# Patient Record
Sex: MALE | Race: NATIVE HAWAIIAN OR OTHER PACIFIC ISLANDER | HISPANIC OR LATINO | Employment: UNEMPLOYED | URBAN - METROPOLITAN AREA
[De-identification: names, ages, dates, MRNs, and addresses within clinical notes are randomized per-mention and may not be internally consistent; named-entity substitution may affect disease eponyms.]

---

## 2024-05-31 DIAGNOSIS — Z02.89 REFUGEE HEALTH EXAMINATION: Primary | ICD-10-CM

## 2024-06-04 ENCOUNTER — APPOINTMENT (OUTPATIENT)
Dept: INTERPRETER SERVICES | Facility: CLINIC | Age: 34
End: 2024-06-04
Payer: MEDICAID

## 2024-06-06 ENCOUNTER — LAB (OUTPATIENT)
Dept: LAB | Facility: CLINIC | Age: 34
End: 2024-06-06
Payer: MEDICAID

## 2024-06-06 DIAGNOSIS — Z02.89 REFUGEE HEALTH EXAMINATION: ICD-10-CM

## 2024-06-06 DIAGNOSIS — R73.09 ELEVATED GLUCOSE: ICD-10-CM

## 2024-06-06 LAB
BASOPHILS # BLD AUTO: 0.1 10E3/UL (ref 0–0.2)
BASOPHILS NFR BLD AUTO: 1 %
EOSINOPHIL # BLD AUTO: 0.1 10E3/UL (ref 0–0.7)
EOSINOPHIL NFR BLD AUTO: 1 %
ERYTHROCYTE [DISTWIDTH] IN BLOOD BY AUTOMATED COUNT: 12.6 % (ref 10–15)
HCT VFR BLD AUTO: 44.7 % (ref 40–53)
HGB BLD-MCNC: 15.2 G/DL (ref 13.3–17.7)
IMM GRANULOCYTES # BLD: 0 10E3/UL
IMM GRANULOCYTES NFR BLD: 0 %
LYMPHOCYTES # BLD AUTO: 2.7 10E3/UL (ref 0.8–5.3)
LYMPHOCYTES NFR BLD AUTO: 33 %
MCH RBC QN AUTO: 27.1 PG (ref 26.5–33)
MCHC RBC AUTO-ENTMCNC: 34 G/DL (ref 31.5–36.5)
MCV RBC AUTO: 80 FL (ref 78–100)
MONOCYTES # BLD AUTO: 0.5 10E3/UL (ref 0–1.3)
MONOCYTES NFR BLD AUTO: 7 %
NEUTROPHILS # BLD AUTO: 4.7 10E3/UL (ref 1.6–8.3)
NEUTROPHILS NFR BLD AUTO: 58 %
NRBC # BLD AUTO: 0 10E3/UL
NRBC BLD AUTO-RTO: 0 /100
PLATELET # BLD AUTO: 321 10E3/UL (ref 150–450)
RBC # BLD AUTO: 5.6 10E6/UL (ref 4.4–5.9)
WBC # BLD AUTO: 8.1 10E3/UL (ref 4–11)

## 2024-06-06 PROCEDURE — 86706 HEP B SURFACE ANTIBODY: CPT

## 2024-06-06 PROCEDURE — 83721 ASSAY OF BLOOD LIPOPROTEIN: CPT | Mod: 59

## 2024-06-06 PROCEDURE — 86787 VARICELLA-ZOSTER ANTIBODY: CPT

## 2024-06-06 PROCEDURE — 86780 TREPONEMA PALLIDUM: CPT

## 2024-06-06 PROCEDURE — 86481 TB AG RESPONSE T-CELL SUSP: CPT

## 2024-06-06 PROCEDURE — 87389 HIV-1 AG W/HIV-1&-2 AB AG IA: CPT

## 2024-06-06 PROCEDURE — 36415 COLL VENOUS BLD VENIPUNCTURE: CPT

## 2024-06-06 PROCEDURE — 87340 HEPATITIS B SURFACE AG IA: CPT

## 2024-06-06 PROCEDURE — 86803 HEPATITIS C AB TEST: CPT

## 2024-06-06 PROCEDURE — 85025 COMPLETE CBC W/AUTO DIFF WBC: CPT

## 2024-06-06 PROCEDURE — 80061 LIPID PANEL: CPT

## 2024-06-06 PROCEDURE — 86708 HEPATITIS A ANTIBODY: CPT

## 2024-06-06 PROCEDURE — 87491 CHLMYD TRACH DNA AMP PROBE: CPT

## 2024-06-06 PROCEDURE — 99000 SPECIMEN HANDLING OFFICE-LAB: CPT

## 2024-06-06 PROCEDURE — 86704 HEP B CORE ANTIBODY TOTAL: CPT

## 2024-06-06 PROCEDURE — 80053 COMPREHEN METABOLIC PANEL: CPT

## 2024-06-07 LAB
ALBUMIN SERPL BCG-MCNC: 4.6 G/DL (ref 3.5–5.2)
ALP SERPL-CCNC: 136 U/L (ref 40–150)
ALT SERPL W P-5'-P-CCNC: 60 U/L (ref 0–70)
ANION GAP SERPL CALCULATED.3IONS-SCNC: 14 MMOL/L (ref 7–15)
AST SERPL W P-5'-P-CCNC: 34 U/L (ref 0–45)
BILIRUB SERPL-MCNC: 0.5 MG/DL
BUN SERPL-MCNC: 11.7 MG/DL (ref 6–20)
C TRACH DNA SPEC QL NAA+PROBE: NEGATIVE
CALCIUM SERPL-MCNC: 9.8 MG/DL (ref 8.6–10)
CHLORIDE SERPL-SCNC: 96 MMOL/L (ref 98–107)
CHOLEST SERPL-MCNC: 232 MG/DL
CREAT SERPL-MCNC: 0.66 MG/DL (ref 0.67–1.17)
DEPRECATED HCO3 PLAS-SCNC: 21 MMOL/L (ref 22–29)
EGFRCR SERPLBLD CKD-EPI 2021: >90 ML/MIN/1.73M2
FASTING STATUS PATIENT QL REPORTED: NO
FASTING STATUS PATIENT QL REPORTED: NO
GLUCOSE SERPL-MCNC: 482 MG/DL (ref 70–99)
HAV AB SER QL IA: REACTIVE
HBV CORE AB SERPL QL IA: NONREACTIVE
HBV SURFACE AB SERPL IA-ACNC: >1000 M[IU]/ML
HBV SURFACE AB SERPL IA-ACNC: REACTIVE M[IU]/ML
HBV SURFACE AG SERPL QL IA: NONREACTIVE
HCV AB SERPL QL IA: NONREACTIVE
HDLC SERPL-MCNC: 34 MG/DL
HIV 1+2 AB+HIV1 P24 AG SERPL QL IA: NONREACTIVE
LDLC SERPL CALC-MCNC: ABNORMAL MG/DL
LDLC SERPL DIRECT ASSAY-MCNC: 138 MG/DL
NONHDLC SERPL-MCNC: 198 MG/DL
POTASSIUM SERPL-SCNC: 4.4 MMOL/L (ref 3.4–5.3)
PROT SERPL-MCNC: 7.9 G/DL (ref 6.4–8.3)
QUANTIFERON MITOGEN: 10 IU/ML
QUANTIFERON NIL TUBE: 0.15 IU/ML
QUANTIFERON TB1 TUBE: 0.1 IU/ML
QUANTIFERON TB2 TUBE: 0.09
SODIUM SERPL-SCNC: 131 MMOL/L (ref 135–145)
T PALLIDUM AB SER QL: NONREACTIVE
TRIGL SERPL-MCNC: 710 MG/DL
VZV IGG SER QL IA: 1258 INDEX
VZV IGG SER QL IA: POSITIVE

## 2024-06-08 LAB
SCHISTOSOMA IGG SER IA-ACNC: 5 U
STRONGYLOIDES IGG SER IA-ACNC: 0.1 IV

## 2024-06-10 DIAGNOSIS — R73.09 ELEVATED GLUCOSE: Primary | ICD-10-CM

## 2024-06-10 LAB
GAMMA INTERFERON BACKGROUND BLD IA-ACNC: 0.15 IU/ML
M TB IFN-G BLD-IMP: NEGATIVE
M TB IFN-G CD4+ BCKGRND COR BLD-ACNC: 9.85 IU/ML
MITOGEN IGNF BCKGRD COR BLD-ACNC: -0.05 IU/ML
MITOGEN IGNF BCKGRD COR BLD-ACNC: -0.06 IU/ML

## 2024-06-12 ENCOUNTER — OFFICE VISIT (OUTPATIENT)
Dept: FAMILY MEDICINE | Facility: CLINIC | Age: 34
End: 2024-06-12
Payer: MEDICAID

## 2024-06-12 VITALS
OXYGEN SATURATION: 98 % | HEART RATE: 89 BPM | HEIGHT: 66 IN | WEIGHT: 201 LBS | SYSTOLIC BLOOD PRESSURE: 124 MMHG | BODY MASS INDEX: 32.3 KG/M2 | DIASTOLIC BLOOD PRESSURE: 80 MMHG | TEMPERATURE: 98.3 F | RESPIRATION RATE: 16 BRPM

## 2024-06-12 DIAGNOSIS — H53.8 BLURRED VISION: ICD-10-CM

## 2024-06-12 DIAGNOSIS — R73.9 ELEVATED BLOOD SUGAR: ICD-10-CM

## 2024-06-12 DIAGNOSIS — E11.22 TYPE 2 DIABETES MELLITUS WITH DIABETIC CHRONIC KIDNEY DISEASE, UNSPECIFIED CKD STAGE, UNSPECIFIED WHETHER LONG TERM INSULIN USE (H): ICD-10-CM

## 2024-06-12 DIAGNOSIS — E66.09 CLASS 1 OBESITY DUE TO EXCESS CALORIES IN ADULT, UNSPECIFIED BMI, UNSPECIFIED WHETHER SERIOUS COMORBIDITY PRESENT: ICD-10-CM

## 2024-06-12 DIAGNOSIS — E66.811 CLASS 1 OBESITY DUE TO EXCESS CALORIES IN ADULT, UNSPECIFIED BMI, UNSPECIFIED WHETHER SERIOUS COMORBIDITY PRESENT: ICD-10-CM

## 2024-06-12 DIAGNOSIS — F43.10 PTSD (POST-TRAUMATIC STRESS DISORDER): ICD-10-CM

## 2024-06-12 DIAGNOSIS — Z02.89 REFUGEE HEALTH EXAMINATION: Primary | ICD-10-CM

## 2024-06-12 DIAGNOSIS — F41.1 GAD (GENERALIZED ANXIETY DISORDER): ICD-10-CM

## 2024-06-12 PROBLEM — E11.9 DIABETES MELLITUS, TYPE 2 (H): Status: ACTIVE | Noted: 2024-06-12

## 2024-06-12 LAB
ALBUMIN UR-MCNC: NEGATIVE MG/DL
APPEARANCE UR: CLEAR
BILIRUB UR QL STRIP: NEGATIVE
COLOR UR AUTO: YELLOW
GLUCOSE UR STRIP-MCNC: 500 MG/DL
HBA1C MFR BLD: 10.9 % (ref 0–5.6)
HGB UR QL STRIP: NEGATIVE
KETONES UR STRIP-MCNC: NEGATIVE MG/DL
LEUKOCYTE ESTERASE UR QL STRIP: NEGATIVE
NITRATE UR QL: NEGATIVE
PH UR STRIP: 5.5 [PH] (ref 5–7)
SP GR UR STRIP: 1.01 (ref 1–1.03)
UROBILINOGEN UR STRIP-ACNC: 0.2 E.U./DL

## 2024-06-12 PROCEDURE — 90471 IMMUNIZATION ADMIN: CPT | Performed by: FAMILY MEDICINE

## 2024-06-12 PROCEDURE — 36415 COLL VENOUS BLD VENIPUNCTURE: CPT

## 2024-06-12 PROCEDURE — 90707 MMR VACCINE SC: CPT | Performed by: FAMILY MEDICINE

## 2024-06-12 PROCEDURE — 90715 TDAP VACCINE 7 YRS/> IM: CPT | Performed by: FAMILY MEDICINE

## 2024-06-12 PROCEDURE — 91320 SARSCV2 VAC 30MCG TRS-SUC IM: CPT | Performed by: FAMILY MEDICINE

## 2024-06-12 PROCEDURE — 81003 URINALYSIS AUTO W/O SCOPE: CPT | Performed by: FAMILY MEDICINE

## 2024-06-12 PROCEDURE — 90472 IMMUNIZATION ADMIN EACH ADD: CPT | Performed by: FAMILY MEDICINE

## 2024-06-12 PROCEDURE — 83036 HEMOGLOBIN GLYCOSYLATED A1C: CPT

## 2024-06-12 PROCEDURE — 90480 ADMN SARSCOV2 VAC 1/ONLY CMP: CPT | Performed by: FAMILY MEDICINE

## 2024-06-12 PROCEDURE — 99204 OFFICE O/P NEW MOD 45 MIN: CPT | Mod: 25 | Performed by: FAMILY MEDICINE

## 2024-06-12 RX ORDER — LISINOPRIL 5 MG/1
5 TABLET ORAL DAILY
Qty: 90 TABLET | Refills: 4 | Status: SHIPPED | OUTPATIENT
Start: 2024-06-12

## 2024-06-12 RX ORDER — METFORMIN HCL 500 MG
2000 TABLET, EXTENDED RELEASE 24 HR ORAL
Qty: 120 TABLET | Refills: 11 | Status: SHIPPED | OUTPATIENT
Start: 2024-06-12

## 2024-06-12 RX ORDER — ATORVASTATIN CALCIUM 10 MG/1
10 TABLET, FILM COATED ORAL DAILY
Qty: 90 TABLET | Refills: 4 | Status: SHIPPED | OUTPATIENT
Start: 2024-06-12
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
## 2024-07-09 ENCOUNTER — OFFICE VISIT (OUTPATIENT)
Dept: OPHTHALMOLOGY | Facility: CLINIC | Age: 34
End: 2024-07-09
Attending: OPHTHALMOLOGY
Payer: MEDICAID

## 2024-07-09 DIAGNOSIS — H11.153 PINGUECULA OF BOTH EYES: ICD-10-CM

## 2024-07-09 DIAGNOSIS — H52.7 REFRACTIVE ERROR: ICD-10-CM

## 2024-07-09 DIAGNOSIS — H53.8 BLURRED VISION: ICD-10-CM

## 2024-07-09 DIAGNOSIS — E11.9 TYPE 2 DIABETES MELLITUS WITHOUT COMPLICATION, WITHOUT LONG-TERM CURRENT USE OF INSULIN (H): Primary | ICD-10-CM

## 2024-07-09 PROCEDURE — 92004 COMPRE OPH EXAM NEW PT 1/>: CPT | Performed by: OPHTHALMOLOGY

## 2024-07-09 PROCEDURE — G0463 HOSPITAL OUTPT CLINIC VISIT: HCPCS | Performed by: OPHTHALMOLOGY

## 2024-07-09 PROCEDURE — 92015 DETERMINE REFRACTIVE STATE: CPT

## 2024-07-09 ASSESSMENT — TONOMETRY
OD_IOP_MMHG: 19
OS_IOP_MMHG: 18
IOP_METHOD: TONOPEN

## 2024-07-09 ASSESSMENT — EXTERNAL EXAM - RIGHT EYE: OD_EXAM: NORMAL

## 2024-07-09 ASSESSMENT — CUP TO DISC RATIO
OS_RATIO: 0.15
OD_RATIO: 0.15

## 2024-07-09 ASSESSMENT — VISUAL ACUITY
OS_SC: 20/60
METHOD: SNELLEN - LINEAR
OD_SC: 20/20
METHOD_MR: RED/GREEN BALANCE
OD_SC+: -2
OS_SC+: -1
OS_PH_SC+: -1
OS_PH_SC: 20/30

## 2024-07-09 ASSESSMENT — CONF VISUAL FIELD
OD_INFERIOR_NASAL_RESTRICTION: 0
OS_INFERIOR_TEMPORAL_RESTRICTION: 0
OD_SUPERIOR_NASAL_RESTRICTION: 0
OS_SUPERIOR_NASAL_RESTRICTION: 0
METHOD: COUNTING FINGERS
OD_NORMAL: 1
OD_INFERIOR_TEMPORAL_RESTRICTION: 0
OD_SUPERIOR_TEMPORAL_RESTRICTION: 0
OS_INFERIOR_NASAL_RESTRICTION: 0
OS_SUPERIOR_TEMPORAL_RESTRICTION: 0
OS_NORMAL: 1

## 2024-07-09 ASSESSMENT — SLIT LAMP EXAM - LIDS
COMMENTS: NORMAL
COMMENTS: NORMAL

## 2024-07-09 ASSESSMENT — EXTERNAL EXAM - LEFT EYE: OS_EXAM: NORMAL

## 2024-07-09 ASSESSMENT — REFRACTION_MANIFEST
OS_AXIS: 063
OS_CYLINDER: +0.50
OS_SPHERE: -1.25
OD_SPHERE: -0.50
OD_CYLINDER: SPHERE

## 2024-07-09 NOTE — PROGRESS NOTES
HPI       Blurred Vision Evaluation    In both eyes.  Associated symptoms include dryness, floaters and burning (left>right).  Negative for eye pain, tearing and itching.  Pain was noted as 0/10.             Comments    Justo is here new to clinic, referred by Dr Osuna for evaluation of blurred vision. History of type 2 diabetes. He states vision has has been blurry for about a year and a half off and on. Sometimes vision is clear.     Lab Results       Component                Value               Date                       A1C                      10.9                06/12/2024    BS today unknown    Kurtis Cadet COT 9:55 AM July 9, 2024                       Last edited by Kurtis Cadet on 7/9/2024  9:55 AM.          Review of systems for the eyes was negative other than the pertinent positives/negatives listed in the HPI.      Assessment & Plan      Justo Jackson is a 34 year old male with the following diagnoses:   1. Type 2 diabetes mellitus without complication, without long-term current use of insulin (H)    2. Blurred vision    3. Pinguecula of both eyes    4. Refractive error         History obtained via interpretor   New patient here for dilated fundus exam.  Poorly controlled diabetes mellitus   Intermittent pain right eye, blur both eyes     No retinopathy  Stressed good glycemic and hypertensive control  Management per primary care physician   Monitor yearly     Refractive options reviewed  Refraction given       Patient disposition:   Return in about 1 year (around 7/9/2025) for DFE, Refraction.           Attending Physician Attestation:  Complete documentation of historical and exam elements from today's encounter can be found in the full encounter summary report (not reduplicated in this progress note).  I personally obtained the chief complaint(s) and history of present illness.  I confirmed and edited as necessary the review of systems, past medical/surgical history, family history,  social history, and examination findings as documented by others; and I examined the patient myself.  I personally reviewed the relevant tests, images, and reports as documented above.  I formulated and edited as necessary the assessment and plan and discussed the findings and management plan with the patient and family. . - Efra Lake MD

## 2024-07-09 NOTE — NURSING NOTE
Chief Complaints and History of Present Illnesses   Patient presents with    Blurred Vision Evaluation     Chief Complaint(s) and History of Present Illness(es)       Blurred Vision Evaluation              Laterality: both eyes    Associated symptoms: dryness, floaters and burning (left>right).  Negative for eye pain, tearing and itching    Pain scale: 0/10              Comments    Justo is here new to clinic, referred by Dr Osuna for evaluation of blurred vision. History of type 2 diabetes. He states vision has has been blurry for about a year and a half off and on. Sometimes vision is clear.     Lab Results       Component                Value               Date                       A1C                      10.9                06/12/2024    BS today unknown    Kurtis Cadet COT 9:55 AM July 9, 2024

## 2024-07-26 ENCOUNTER — APPOINTMENT (OUTPATIENT)
Dept: OPTOMETRY | Facility: CLINIC | Age: 34
End: 2024-07-26
Payer: MEDICAID

## 2024-07-26 PROCEDURE — 92340 FIT SPECTACLES MONOFOCAL: CPT | Performed by: OPTOMETRIST

## 2024-08-12 ENCOUNTER — OFFICE VISIT (OUTPATIENT)
Dept: FAMILY MEDICINE | Facility: CLINIC | Age: 34
End: 2024-08-12
Payer: COMMERCIAL

## 2024-08-12 VITALS
SYSTOLIC BLOOD PRESSURE: 130 MMHG | RESPIRATION RATE: 18 BRPM | OXYGEN SATURATION: 98 % | BODY MASS INDEX: 30.98 KG/M2 | DIASTOLIC BLOOD PRESSURE: 85 MMHG | TEMPERATURE: 98.2 F | HEIGHT: 66 IN | HEART RATE: 88 BPM | WEIGHT: 192.8 LBS

## 2024-08-12 DIAGNOSIS — F43.10 PTSD (POST-TRAUMATIC STRESS DISORDER): ICD-10-CM

## 2024-08-12 DIAGNOSIS — E66.811 CLASS 1 OBESITY DUE TO EXCESS CALORIES IN ADULT, UNSPECIFIED BMI, UNSPECIFIED WHETHER SERIOUS COMORBIDITY PRESENT: ICD-10-CM

## 2024-08-12 DIAGNOSIS — R06.83 SNORING: ICD-10-CM

## 2024-08-12 DIAGNOSIS — R07.9 CHEST PAIN, UNSPECIFIED TYPE: ICD-10-CM

## 2024-08-12 DIAGNOSIS — E11.65 TYPE 2 DIABETES MELLITUS WITH HYPERGLYCEMIA, WITHOUT LONG-TERM CURRENT USE OF INSULIN (H): Primary | ICD-10-CM

## 2024-08-12 DIAGNOSIS — F41.1 GAD (GENERALIZED ANXIETY DISORDER): ICD-10-CM

## 2024-08-12 DIAGNOSIS — F51.01 PRIMARY INSOMNIA: ICD-10-CM

## 2024-08-12 DIAGNOSIS — E66.09 CLASS 1 OBESITY DUE TO EXCESS CALORIES IN ADULT, UNSPECIFIED BMI, UNSPECIFIED WHETHER SERIOUS COMORBIDITY PRESENT: ICD-10-CM

## 2024-08-12 LAB
ATRIAL RATE - MUSE: 81 BPM
CREAT UR-MCNC: 39.2 MG/DL
DIASTOLIC BLOOD PRESSURE - MUSE: NORMAL MMHG
INTERPRETATION ECG - MUSE: NORMAL
MICROALBUMIN UR-MCNC: <12 MG/L
MICROALBUMIN/CREAT UR: NORMAL MG/G{CREAT}
P AXIS - MUSE: 40 DEGREES
PR INTERVAL - MUSE: 154 MS
QRS DURATION - MUSE: 96 MS
QT - MUSE: 358 MS
QTC - MUSE: 415 MS
R AXIS - MUSE: 31 DEGREES
SYSTOLIC BLOOD PRESSURE - MUSE: NORMAL MMHG
T AXIS - MUSE: 41 DEGREES
VENTRICULAR RATE- MUSE: 81 BPM

## 2024-08-12 PROCEDURE — 90677 PCV20 VACCINE IM: CPT | Performed by: FAMILY MEDICINE

## 2024-08-12 PROCEDURE — 82570 ASSAY OF URINE CREATININE: CPT | Performed by: FAMILY MEDICINE

## 2024-08-12 PROCEDURE — 82043 UR ALBUMIN QUANTITATIVE: CPT | Performed by: FAMILY MEDICINE

## 2024-08-12 PROCEDURE — 93005 ELECTROCARDIOGRAM TRACING: CPT | Performed by: FAMILY MEDICINE

## 2024-08-12 PROCEDURE — 99215 OFFICE O/P EST HI 40 MIN: CPT | Mod: 25 | Performed by: FAMILY MEDICINE

## 2024-08-12 PROCEDURE — 90471 IMMUNIZATION ADMIN: CPT | Performed by: FAMILY MEDICINE

## 2024-08-12 RX ORDER — LANOLIN ALCOHOL/MO/W.PET/CERES
3 CREAM (GRAM) TOPICAL
Qty: 30 TABLET | Refills: 3 | Status: SHIPPED | OUTPATIENT
Start: 2024-08-12 | End: 2024-09-18

## 2024-08-12 RX ORDER — LANCETS
EACH MISCELLANEOUS
Qty: 100 EACH | Refills: 6 | Status: SHIPPED | OUTPATIENT
Start: 2024-08-12

## 2024-08-12 ASSESSMENT — ENCOUNTER SYMPTOMS: NERVOUS/ANXIOUS: 1

## 2024-08-12 NOTE — PROGRESS NOTES
Assessment & Plan     Type 2 diabetes mellitus with hyperglycemia, without long-term current use of insulin (H)  He has not been checking his blood sugars regularly and is not due for an A1c yet.  He is taking the metformin 2000 mg.  He has some fatigue but I suspect this is due to his insomnia and not the metformin.  He has a glucometer from [facility] and does not think he can get supplies that will fit it.  Sent prescription for new glucometer today.  Continue metformin.  He will come back in 1 month with his blood sugars and we will check an A1c at that time.  - Albumin Random Urine Quantitative with Creat Ratio; Future  - blood glucose monitoring (NO BRAND SPECIFIED) meter device kit; Use to test blood sugar 3 times daily or as directed. Preferred blood glucose meter OR supplies to accompany: Blood Glucose Monitor Brands: per insurance.  - blood glucose (NO BRAND SPECIFIED) test strip; Use to test blood sugar 3 times daily or as directed. To accompany: Blood Glucose Monitor Brands: per insurance.  - thin (NO BRAND SPECIFIED) lancets; Use with lanceting device. To accompany: Blood Glucose Monitor Brands: per insurance.  - Albumin Random Urine Quantitative with Creat Ratio    CELINE (generalized anxiety disorder)  PTSD (post-traumatic stress disorder)  He feels his anxiety is well-controlled at this point without medication.  He does not think his insomnia is related to his anxiety.    Class 1 obesity due to excess calories in adult, unspecified BMI, unspecified whether serious comorbidity present  He has lost weight since his last visit.  He has made some dietary changes.  He will check his blood sugars to make sure that they are not too high.    Snoring  Primary insomnia  He has several risk factors for sleep apnea.  Will refer to sleep medicine.  Will also try melatonin as needed on the nights where he has difficulty falling asleep.  - Adult Sleep Eval & Management  Referral; Future  - melatonin 3 MG  "tablet; Take 1 tablet (3 mg) by mouth nightly as needed for sleep    Chest pain, unspecified type  Patient had an episode of atypical chest pain although a bit over a week ago.  EKG done today which is normal.  I have a low suspicion for cardiac etiology.  More likely due to anxiety and stress.  Continue to monitor.  Reviewed reasons to seek care.  - EKG 12-lead, tracing only          BMI  Estimated body mass index is 31.36 kg/m  as calculated from the following:    Height as of this encounter: 1.67 m (5' 5.75\").    Weight as of this encounter: 87.5 kg (192 lb 12.8 oz).   Weight management plan: Discussed healthy diet and exercise guidelines      Follow up in 1mo    The longitudinal plan of care for the diagnosis(es)/condition(s) as documented were addressed during this visit. Due to the added complexity in care, I will continue to support Justo in the subsequent management and with ongoing continuity of care.      I spent a total of 46 minutes on the day of the visit.   Time spent by me doing chart review, history and exam, documentation and further activities per the note    Subjective   Justo is a 34 year old, presenting for the following health issues:  Diabetes (Metformin causes fatigue when he takes 2000 mg) and Anxiety (/)        8/12/2024     9:30 AM   Additional Questions   Roomed by Debora MATA   Accompanied by glory   An  was not used for this visit. It was conducted in Romansh by an approved bilingual provider, [id number], certified by [employer].       History of Present Illness       Diabetes:   He presents for follow up of diabetes.  He is checking home blood glucose a few times a month.   He checks blood glucose after meals.  Blood glucose is sometimes over 200 and never under 70. He is aware of hypoglycemia symptoms including shakiness, dizziness and blurred vision.   He is concerned about other.   He is having burning in feet, excessive thirst and weight loss.            He eats 2-3 " "servings of fruits and vegetables daily.He consumes 0 sweetened beverage(s) daily.He exercises with enough effort to increase his heart rate 9 or less minutes per day.  He exercises with enough effort to increase his heart rate 3 or less days per week.   He is taking medications regularly.     Only checks sugars when he feels bad  Highest is 120s  Overnight when he wakes up it is in 80s  Snores, wife says it is loud  When he wakes up, sometimes he has to spit up blood - this has resolved  He tries to go to sleep around 1030 or 11.  Sometimes he is able to fall asleep quickly.  But then he will wake up around 2 AM or 3 AM and when he wakes up he has palpitations his heart is racing.  He does not recall dreams or nightmares.  He sometimes has a hard time falling asleep.  He does not think that it is from stress or anxiety.  He did have a time in his life a few years ago where he was having thoughts of anxiety and panic attacks.  He was given alprazolam which he felt was too strong.  He feels like his anxiety is actually pretty good right now.  He does miss his family who are still in [facility] but he is able to talk to them and this helps.    Had an episode of chest pain, lasted 2h, was laying down, not associated with physical activity, about 10 days ago    Was doing construction, now working in factory    Has made some changes to diet, wondering if this could be causing weight loss, but worried it could be high blood sugars              Review of Systems  Constitutional, HEENT, cardiovascular, pulmonary, gi and gu systems are negative, except as otherwise noted.      Objective    /85   Pulse 88   Temp 98.2  F (36.8  C) (Oral)   Resp 18   Ht 1.67 m (5' 5.75\")   Wt 87.5 kg (192 lb 12.8 oz)   SpO2 98%   BMI 31.36 kg/m    Body mass index is 31.36 kg/m .  Wt Readings from Last 3 Encounters:   08/12/24 87.5 kg (192 lb 12.8 oz)   06/12/24 91.2 kg (201 lb)     BP Readings from Last 3 Encounters:   08/12/24 " 130/85   06/12/24 124/80         Physical Exam   GENERAL: alert and no distress  NECK: no adenopathy, no asymmetry, masses, or scars  RESP: lungs clear to auscultation - no rales, rhonchi or wheezes  CV: regular rate and rhythm, normal S1 S2, no S3 or S4, no murmur, click or rub, no peripheral edema  ABDOMEN: soft, nontender, without hepatosplenomegaly or masses, bowel sounds normal, and well-healed incision from laparotomy  MS: no gross musculoskeletal defects noted, no edema  Diabetic foot exam: normal DP and PT pulses, no trophic changes or ulcerative lesions, normal sensory exam, and normal monofilament exam    EKG - Reviewed and interpreted by me appears normal, NSR, normal axis, normal intervals, no acute ST/T changes c/w ischemia, no LVH by voltage criteria, no prior tracings      Prior to immunization administration, verified patients identity using patient s name and date of birth. Please see Immunization Activity for additional information.     Screening Questionnaire for Adult Immunization    Are you sick today?   No   Do you have allergies to medications, food, a vaccine component or latex?   No   Have you ever had a serious reaction after receiving a vaccination?   No   Do you have a long-term health problem with heart, lung, kidney, or metabolic disease (e.g., diabetes), asthma, a blood disorder, no spleen, complement component deficiency, a cochlear implant, or a spinal fluid leak?  Are you on long-term aspirin therapy?   No   Do you have cancer, leukemia, HIV/AIDS, or any other immune system problem?   No   Do you have a parent, brother, or sister with an immune system problem?   No   In the past 3 months, have you taken medications that affect  your immune system, such as prednisone, other steroids, or anticancer drugs; drugs for the treatment of rheumatoid arthritis, Crohn s disease, or psoriasis; or have you had radiation treatments?   No   Have you had a seizure, or a brain or other nervous  system problem?   No   During the past year, have you received a transfusion of blood or blood    products, or been given immune (gamma) globulin or antiviral drug?   No   For women: Are you pregnant or is there a chance you could become       pregnant during the next month?   No   Have you received any vaccinations in the past 4 weeks?   No     Immunization questionnaire answers were all negative.      Patient instructed to remain in clinic for 15 minutes afterwards, and to report any adverse reactions.     Screening performed by Celso Mendez MA on 8/12/2024 at 11:17 AM.       Signed Electronically by: Graciela Ovalle MD

## 2024-08-14 ENCOUNTER — TELEPHONE (OUTPATIENT)
Dept: FAMILY MEDICINE | Facility: CLINIC | Age: 34
End: 2024-08-14

## 2024-08-14 NOTE — TELEPHONE ENCOUNTER
Patient Quality Outreach    Patient is due for the following:   Physical     Next Steps:   Schedule a Adult Preventative    Type of outreach:    Chart review performed, no outreach needed. Upcoming appt scheduled      Questions for provider review:    None           Debora Saenz MA  Chart routed to N/A.

## 2024-08-18 ENCOUNTER — TRANSFERRED RECORDS (OUTPATIENT)
Dept: MULTI SPECIALTY CLINIC | Facility: CLINIC | Age: 34
End: 2024-08-18

## 2024-08-18 LAB — RETINOPATHY: NORMAL

## 2024-09-16 DIAGNOSIS — E11.9 TYPE 2 DIABETES MELLITUS WITHOUT COMPLICATION, WITHOUT LONG-TERM CURRENT USE OF INSULIN (H): Primary | ICD-10-CM

## 2024-09-17 ENCOUNTER — OFFICE VISIT (OUTPATIENT)
Dept: FAMILY MEDICINE | Facility: CLINIC | Age: 34
End: 2024-09-17
Payer: COMMERCIAL

## 2024-09-17 VITALS
OXYGEN SATURATION: 99 % | BODY MASS INDEX: 31.18 KG/M2 | TEMPERATURE: 98.2 F | HEART RATE: 82 BPM | RESPIRATION RATE: 12 BRPM | SYSTOLIC BLOOD PRESSURE: 133 MMHG | WEIGHT: 194 LBS | DIASTOLIC BLOOD PRESSURE: 85 MMHG | HEIGHT: 66 IN

## 2024-09-17 DIAGNOSIS — A63.0 GENITAL WARTS: ICD-10-CM

## 2024-09-17 DIAGNOSIS — E11.9 TYPE 2 DIABETES MELLITUS WITHOUT COMPLICATION, WITHOUT LONG-TERM CURRENT USE OF INSULIN (H): Primary | ICD-10-CM

## 2024-09-17 DIAGNOSIS — Z23 NEED FOR VACCINATION: ICD-10-CM

## 2024-09-17 DIAGNOSIS — R06.83 SNORING: ICD-10-CM

## 2024-09-17 DIAGNOSIS — R04.2 SPITTING UP BLOOD: ICD-10-CM

## 2024-09-17 LAB — HBA1C MFR BLD: 11 % (ref 0–5.6)

## 2024-09-17 PROCEDURE — 36415 COLL VENOUS BLD VENIPUNCTURE: CPT | Performed by: FAMILY MEDICINE

## 2024-09-17 PROCEDURE — 83036 HEMOGLOBIN GLYCOSYLATED A1C: CPT | Performed by: FAMILY MEDICINE

## 2024-09-17 PROCEDURE — 99214 OFFICE O/P EST MOD 30 MIN: CPT | Mod: 25 | Performed by: FAMILY MEDICINE

## 2024-09-17 PROCEDURE — 90656 IIV3 VACC NO PRSV 0.5 ML IM: CPT | Performed by: FAMILY MEDICINE

## 2024-09-17 PROCEDURE — 90471 IMMUNIZATION ADMIN: CPT | Performed by: FAMILY MEDICINE

## 2024-09-17 RX ORDER — IMIQUIMOD 12.5 MG/.25G
CREAM TOPICAL
Qty: 12 PACKET | Refills: 3 | Status: SHIPPED | OUTPATIENT
Start: 2024-09-17

## 2024-09-17 RX ORDER — FLUTICASONE PROPIONATE 50 MCG
1 SPRAY, SUSPENSION (ML) NASAL DAILY
Qty: 15.8 ML | Refills: 0 | Status: SHIPPED | OUTPATIENT
Start: 2024-09-17

## 2024-09-17 ASSESSMENT — ANXIETY QUESTIONNAIRES
6. BECOMING EASILY ANNOYED OR IRRITABLE: SEVERAL DAYS
3. WORRYING TOO MUCH ABOUT DIFFERENT THINGS: MORE THAN HALF THE DAYS
5. BEING SO RESTLESS THAT IT IS HARD TO SIT STILL: MORE THAN HALF THE DAYS
7. FEELING AFRAID AS IF SOMETHING AWFUL MIGHT HAPPEN: SEVERAL DAYS
GAD7 TOTAL SCORE: 12
GAD7 TOTAL SCORE: 12
IF YOU CHECKED OFF ANY PROBLEMS ON THIS QUESTIONNAIRE, HOW DIFFICULT HAVE THESE PROBLEMS MADE IT FOR YOU TO DO YOUR WORK, TAKE CARE OF THINGS AT HOME, OR GET ALONG WITH OTHER PEOPLE: SOMEWHAT DIFFICULT
1. FEELING NERVOUS, ANXIOUS, OR ON EDGE: MORE THAN HALF THE DAYS
2. NOT BEING ABLE TO STOP OR CONTROL WORRYING: MORE THAN HALF THE DAYS

## 2024-09-17 ASSESSMENT — PATIENT HEALTH QUESTIONNAIRE - PHQ9: 5. POOR APPETITE OR OVEREATING: MORE THAN HALF THE DAYS

## 2024-09-17 ASSESSMENT — ENCOUNTER SYMPTOMS: NERVOUS/ANXIOUS: 1

## 2024-09-17 NOTE — PROGRESS NOTES
Assessment & Plan     Type 2 diabetes mellitus without complication, without long-term current use of insulin (H)  A1c is not at goal, and is in fact very similar to what it was 3 months ago despite taking metformin 2000 mg daily.  We discussed starting a GLP-1 agonist versus SGLT2 inhibitor and he would prefer to treat with pills if possible.  Will start Jardiance 10 mg daily.  His wife was also recently started on this medicine.  Continue to check blood sugars at home.  He was scheduled for a new MTM visit for assistance with titration of medications.  He understands that he will need a BMP in 1 month and that we will likely increase the dose of Jardiance to 25 mg if he is tolerating it.  - Hemoglobin A1c  - empagliflozin (JARDIANCE) 10 MG TABS tablet; Take 1 tablet (10 mg) by mouth daily.    Genital warts  He declines exam today, stating he is embarrassed by this.  I also see his wife who does have genital warts and he describes the appearance is consistent with this.  He would like to try imiquimod.  - imiquimod (ALDARA) 5 % external cream; Apply a small sized amount to warts or molluscum three times weekly at bedtime.   Wash off after 8 hours.   May use for up to 16 weeks.    Need for vaccination  - INFLUENZA VACCINE,SPLIT VIRUS,TRIVALENT,PF(FLUZONE)    Spitting up blood  He does report some nasal congestion so we will try Flonase first.  If this is not improving, would send to ENT.  - fluticasone (FLONASE) 50 MCG/ACT nasal spray; Spray 1 spray into both nostrils daily.    Snoring  He reports that snoring has improved and his sleep is better.  He declines rescheduling sleep evaluation.          I spent a total of 30 minutes on the day of the visit.   Time spent by me doing chart review, history and exam, documentation and further activities per the note    Follow-up in 3 months for diabetes.    Moriah Kemp is a 34 year old, presenting for the following health issues:  Follow Up (Diabetes ) and Anxiety  (Has happened previous and the most recently panic attack was just yesterday .)      9/17/2024     2:43 PM   Additional Questions   Roomed by Akiko Bates   Accompanied by Self     Anxiety    History of Present Illness       Diabetes:   He presents for follow up of diabetes.  He is checking home blood glucose two times daily.   He checks blood glucose before and after meals and at bedtime.  Blood glucose is sometimes over 200 and never under 70. He is aware of hypoglycemia symptoms including none and other. When his blood glucose is low, the patient is asymptomatic for confusion, blurred vision, lethargy and reports not feeling dizzy, shaky, or weak.   He has no concerns regarding his diabetes at this time.  He is having burning in feet and excessive thirst.              Panic attacks started 5y ago  Last was 8mos ago, in [facility]  Had 1 yesterday evening.  He has had a stressful few weeks.  His symptoms include racing heart, shortness of breath, feeling that he cannot feel any of his body.  When the panic attack started in [facility], he had extensive testing including a 24-hour Holter monitor (describes wearing a box for 24 hours), blood tests, EEG (says that he were stickers on his head and he looked at his brain).  He said all this was normal.  He was given alprazolam when this all started in [facility] to use only at bedtime.  Sleeping well now, missed sleep study, does not feel he needs it    Working now, two different jobs, not stable work - he will be called and have to go    His wife has genital warts and he has had to on his penis.  He says that they look like skin tags but smaller.  He is wondering if there is something that he can put on them to remove them.    He continues to spit up blood in the morning                  Review of Systems  Constitutional, HEENT, cardiovascular, pulmonary, gi and gu systems are negative, except as otherwise noted.      Objective    /85 (BP Location: Right arm,  "Patient Position: Sitting, Cuff Size: Adult Regular)   Pulse 82   Temp 98.2  F (36.8  C) (Oral)   Resp 12   Ht 1.67 m (5' 5.75\")   Wt 88 kg (194 lb)   SpO2 99%   BMI 31.55 kg/m    Body mass index is 31.55 kg/m .  Physical Exam   General: well-appearing, no acute distress  HEENT: normocephalic, atraumatic, mucous membranes moist, no cervical lymphadenopathy, thyroid not enlarged, oropharynx normal, nasal mucosa normal  Eyes: conjunctivae normal  Neck: normal ROM, supple  Cardiovascular: regular rate and rhythm, normal S1 and S2, no murmurs/rubs/gallops  Pulmonary: no increased work of breathing, clear to auscultation bilaterally, no wheezes/rales/rhonchi  Musculoskeletal: normal ROM, no deformity  Neurological: gross motor exam normal  Skin: no rashes or lesions         Results for orders placed or performed in visit on 09/17/24   Hemoglobin A1c     Status: Abnormal   Result Value Ref Range    Hemoglobin A1C 11.0 (H) 0.0 - 5.6 %    Narrative    Results reviewed, correlates with previous. September 17, 2024 Stuart Farmer MA             Signed Electronically by: Graciela Ovalle MD    "

## 2024-09-18 PROBLEM — F41.0 GENERALIZED ANXIETY DISORDER WITH PANIC ATTACKS: Status: ACTIVE | Noted: 2024-06-12

## 2024-09-19 ENCOUNTER — HOSPITAL ENCOUNTER (EMERGENCY)
Facility: CLINIC | Age: 34
Discharge: HOME | End: 2024-09-19
Attending: EMERGENCY MEDICINE | Admitting: EMERGENCY MEDICINE
Payer: COMMERCIAL

## 2024-09-19 ENCOUNTER — APPOINTMENT (OUTPATIENT)
Dept: GENERAL RADIOLOGY | Facility: CLINIC | Age: 34
End: 2024-09-19
Attending: EMERGENCY MEDICINE
Payer: COMMERCIAL

## 2024-09-19 VITALS
BODY MASS INDEX: 31.16 KG/M2 | OXYGEN SATURATION: 96 % | RESPIRATION RATE: 18 BRPM | SYSTOLIC BLOOD PRESSURE: 137 MMHG | DIASTOLIC BLOOD PRESSURE: 87 MMHG | TEMPERATURE: 99 F | HEART RATE: 84 BPM | WEIGHT: 191.58 LBS

## 2024-09-19 DIAGNOSIS — R07.9 CHEST PAIN, UNSPECIFIED TYPE: ICD-10-CM

## 2024-09-19 LAB
ANION GAP SERPL CALCULATED.3IONS-SCNC: 14 MMOL/L (ref 7–15)
BASOPHILS # BLD AUTO: 0.1 10E3/UL (ref 0–0.2)
BASOPHILS NFR BLD AUTO: 0 %
BUN SERPL-MCNC: 10.6 MG/DL (ref 6–20)
CALCIUM SERPL-MCNC: 9.1 MG/DL (ref 8.8–10.4)
CHLORIDE SERPL-SCNC: 101 MMOL/L (ref 98–107)
CREAT SERPL-MCNC: 0.67 MG/DL (ref 0.67–1.17)
EGFRCR SERPLBLD CKD-EPI 2021: >90 ML/MIN/1.73M2
EOSINOPHIL # BLD AUTO: 0 10E3/UL (ref 0–0.7)
EOSINOPHIL NFR BLD AUTO: 0 %
ERYTHROCYTE [DISTWIDTH] IN BLOOD BY AUTOMATED COUNT: 13 % (ref 10–15)
GLUCOSE SERPL-MCNC: 213 MG/DL (ref 70–99)
HCO3 SERPL-SCNC: 22 MMOL/L (ref 22–29)
HCT VFR BLD AUTO: 40.1 % (ref 40–53)
HGB BLD-MCNC: 13.2 G/DL (ref 13.3–17.7)
IMM GRANULOCYTES # BLD: 0.1 10E3/UL
IMM GRANULOCYTES NFR BLD: 0 %
LYMPHOCYTES # BLD AUTO: 1.8 10E3/UL (ref 0.8–5.3)
LYMPHOCYTES NFR BLD AUTO: 16 %
MCH RBC QN AUTO: 26.8 PG (ref 26.5–33)
MCHC RBC AUTO-ENTMCNC: 32.9 G/DL (ref 31.5–36.5)
MCV RBC AUTO: 81 FL (ref 78–100)
MONOCYTES # BLD AUTO: 0.8 10E3/UL (ref 0–1.3)
MONOCYTES NFR BLD AUTO: 7 %
NEUTROPHILS # BLD AUTO: 8.7 10E3/UL (ref 1.6–8.3)
NEUTROPHILS NFR BLD AUTO: 77 %
NRBC # BLD AUTO: 0 10E3/UL
NRBC BLD AUTO-RTO: 0 /100
PLATELET # BLD AUTO: 286 10E3/UL (ref 150–450)
POTASSIUM SERPL-SCNC: 3.6 MMOL/L (ref 3.4–5.3)
RBC # BLD AUTO: 4.93 10E6/UL (ref 4.4–5.9)
SODIUM SERPL-SCNC: 137 MMOL/L (ref 135–145)
TROPONIN T SERPL HS-MCNC: <6 NG/L
WBC # BLD AUTO: 11.4 10E3/UL (ref 4–11)

## 2024-09-19 PROCEDURE — 71046 X-RAY EXAM CHEST 2 VIEWS: CPT

## 2024-09-19 PROCEDURE — 85025 COMPLETE CBC W/AUTO DIFF WBC: CPT | Performed by: EMERGENCY MEDICINE

## 2024-09-19 PROCEDURE — 36415 COLL VENOUS BLD VENIPUNCTURE: CPT | Performed by: EMERGENCY MEDICINE

## 2024-09-19 PROCEDURE — 93005 ELECTROCARDIOGRAM TRACING: CPT

## 2024-09-19 PROCEDURE — 80048 BASIC METABOLIC PNL TOTAL CA: CPT | Performed by: EMERGENCY MEDICINE

## 2024-09-19 PROCEDURE — 84484 ASSAY OF TROPONIN QUANT: CPT | Performed by: EMERGENCY MEDICINE

## 2024-09-19 PROCEDURE — 99285 EMERGENCY DEPT VISIT HI MDM: CPT | Mod: 25

## 2024-09-19 ASSESSMENT — ACTIVITIES OF DAILY LIVING (ADL)
ADLS_ACUITY_SCORE: 33
ADLS_ACUITY_SCORE: 35
ADLS_ACUITY_SCORE: 35

## 2024-09-19 ASSESSMENT — COLUMBIA-SUICIDE SEVERITY RATING SCALE - C-SSRS
6. HAVE YOU EVER DONE ANYTHING, STARTED TO DO ANYTHING, OR PREPARED TO DO ANYTHING TO END YOUR LIFE?: NO
1. IN THE PAST MONTH, HAVE YOU WISHED YOU WERE DEAD OR WISHED YOU COULD GO TO SLEEP AND NOT WAKE UP?: NO
2. HAVE YOU ACTUALLY HAD ANY THOUGHTS OF KILLING YOURSELF IN THE PAST MONTH?: NO

## 2024-09-20 LAB
ATRIAL RATE - MUSE: 104 BPM
DIASTOLIC BLOOD PRESSURE - MUSE: NORMAL MMHG
INTERPRETATION ECG - MUSE: NORMAL
P AXIS - MUSE: 40 DEGREES
PR INTERVAL - MUSE: 188 MS
QRS DURATION - MUSE: 100 MS
QT - MUSE: 340 MS
QTC - MUSE: 447 MS
R AXIS - MUSE: 29 DEGREES
SYSTOLIC BLOOD PRESSURE - MUSE: NORMAL MMHG
T AXIS - MUSE: 38 DEGREES
VENTRICULAR RATE- MUSE: 104 BPM

## 2024-09-20 NOTE — ED TRIAGE NOTES
Patient c/o left sided chest pain since this morning, feels like his heart is racing, states that he was somewhat nauseated and had some diarrhea earlier. States that now he can't sleep and feels anxious. ABCs intact.       Patient states after triage that he's been having left sided face warmness and numbness since 3am.
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
## 2024-09-20 NOTE — DISCHARGE INSTRUCTIONS
Follow up with cardiology in 1-2 days    Follow-up with your primary care doctor in 1 to 2 days    Return to the ER for any worsening or concerning symptoms

## 2024-10-15 ENCOUNTER — OFFICE VISIT (OUTPATIENT)
Dept: PHARMACY | Facility: CLINIC | Age: 34
End: 2024-10-15
Payer: COMMERCIAL

## 2024-10-15 VITALS
WEIGHT: 193.3 LBS | HEART RATE: 75 BPM | OXYGEN SATURATION: 98 % | BODY MASS INDEX: 31.44 KG/M2 | SYSTOLIC BLOOD PRESSURE: 118 MMHG | DIASTOLIC BLOOD PRESSURE: 76 MMHG

## 2024-10-15 DIAGNOSIS — I15.9 SECONDARY HYPERTENSION: ICD-10-CM

## 2024-10-15 DIAGNOSIS — E11.9 TYPE 2 DIABETES MELLITUS WITHOUT COMPLICATION, WITHOUT LONG-TERM CURRENT USE OF INSULIN (H): Primary | ICD-10-CM

## 2024-10-15 DIAGNOSIS — E78.2 MIXED HYPERLIPIDEMIA: ICD-10-CM

## 2024-10-15 PROCEDURE — 99607 MTMS BY PHARM ADDL 15 MIN: CPT | Performed by: PHARMACIST

## 2024-10-15 PROCEDURE — 99605 MTMS BY PHARM NP 15 MIN: CPT | Performed by: PHARMACIST

## 2024-10-15 NOTE — PROGRESS NOTES
Medication Therapy Management (MTM) Encounter    ASSESSMENT:                            Medication Adherence/Access: Recommended switching medication administration times to help with medication adherence    1. Type 2 diabetes mellitus without complication, without long-term current use of insulin (H)  A1c not at goal <7%. Patient would benefit from increasing his Jardiance dose, prescription sent today, also recommended switching to morning administration. Large discussion today regarding diet and exercise adjustments for diabetes control, patient does not agree to diabetic educator referral today but would possibly consider in the future.  Encouraged patient to continue monitoring blood sugars.    2. Secondary hypertension  BP at goal <130/80 mmHg in clinic today.  As noted per patient, symptoms of dizziness likely related to feeling anxious rather than medication side effect, though recommended patient continue to monitor closely.  Recommend further workup from PCP regarding patient anxiety symptoms.  No changes recommended.    3. Mixed hyperlipidemia  Patient appropriately taking moderate intensity statin, though as noted above recommended changing administration time.    PLAN:                            Increase dose: Jardiance 25 mg daily  Focus on diet and exercise to improve blood sugar at this time  Change medication timing to help with adherence  Patient to take Jardiance in the morning   Patient to take lisinopril and atorvastatin with evening dose of metformin    Medication issues to be addressed at a future visit:   CGM?  CDE?  Anxiety to be discussed with PCP    Follow-up: Return in about 3 months (around 1/23/2025) for With PharmD.    SUBJECTIVE/OBJECTIVE:                          Justo Jackson is a 34 year old male seen for an initial visit. He was referred to me from Graciela Ovalle. Patient seen with  . Second  ID#[id number].    Reason for visit: MTM initial -  DM focus per PCP.    Allergies/ADRs: Reviewed in chart  Past Medical History: Reviewed in chart  Tobacco: He reports that he quit smoking about 8 years ago. His smoking use included cigarettes. He started smoking about 4 years ago. He has a 2.4 pack-year smoking history. He has never been exposed to tobacco smoke. He has never used smokeless tobacco.  Alcohol: none    Medication Adherence/Access: Self management. Patient takes medications directly from bottles and uses pill box(es).  Patient takes medications 3 time(s) per day.   Per patient, misses medication 2 time(s) per week. Admits that he will forget the midday dose of medication about twice weekly. Doesn't forget am and om doses.   Patient does not bring medications or glucometer to visit today, though able to recall current medications.     Diabetes   Metformin  mg - 2 tablets twice daily   Jardiance 10 mg daily - taking at night time    Patient is experiencing the following side effects: nausea occasionally, and diarrhea occasionally, worse when starting the meds but now better. Tolerating overall and not concerned about symptoms.   In [facility] he had pre-diabetes, new DM diagnosis when he got here.    Patient would like to be more mindful of his diet and taking medications consistently before starting injectable medication. Reports that he does feel comfortable at this time with his diet, not interested in meeting with diabetic education at this time, but would be willing to meet with CDE in the future.     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 Has lost 10 lbs in the last 6 months since moving to the [facility]. Reports drinking 1 Gatorade at work per day if busy day. Rarely drinks soda, used to drink coffee but has quit since ER visit, will drink camomile or lime blossom tea.   Diet includes:  Breakfast: Eggs with some kind of  sauce  Lunch: Chicken, rice or pasta, broccoli, peppers, fruits (banana, apple, grapes)  Evening: Left overs from lunch, stuffed meat, hot rice beverage (made with corn powder)     Blood sugar monitoring: Traditional meter once weekly; Ranges: (patient reported)   Fasting- 180-190  Post-Prandial- 225-235   Eye exam is up to date  Foot exam is up to date    Hypertension   Lisinopril 5 mg daily - taking midday   Patient reports the following medication side effects: dizziness sometimes, feels unsteady at times. Dizzy not often, only sometimes. Has been feeling this way for 5+ years, feels this way when he feels anxious.   Drinks about 2 liters of water every day.  Patient self monitors blood pressure.  Home BP monitoring 145/90 highest, lower is 135/80  .       Hyperlipidemia   Atorvastatin 10 mg daily  - taking midday   Patient reports no significant myalgias or other side effects.  Patient seen in ED 9/19 for chest pain. Reports always having chest pain, but nothing that severe since then. Cardiology appt scheduled 11/13/24.   The ASCVD Risk score (Sunny DK, et al., 2019) failed to calculate for the following reasons:    The 2019 ASCVD risk score is only valid for ages 40 to 79     Today's Vitals: /76   Pulse 75   Wt 193 lb 4.8 oz (87.7 kg)   SpO2 98%   BMI 31.44 kg/m    ----------------  Post Discharge Medication Reconciliation Status: discharge medications reconciled, continue medications without change.  Patient seen in ED on 9/19.      I spent 60 minutes with this patient today. All changes were made via collaborative practice agreement with Graciela Ovalle MD. A copy of the visit note was provided to the patient's provider(s).    A summary of these recommendations was declined by the patient.    Flora Waller, PharmD, BCACP  Medication Therapy Management Pharmacist     Medication Therapy Recommendations  Diabetes mellitus, type 2 (H)    Current Medication: empagliflozin (JARDIANCE) 10  MG TABS tablet (Discontinued)   Rationale: Dose too low - Dosage too low - Effectiveness   Recommendation: Increase Dose   Status: Accepted per CPA         Mixed hyperlipidemia    Current Medication: atorvastatin (LIPITOR) 10 MG tablet   Rationale: Patient forgets to take - Adherence - Adherence   Recommendation: Provide Adherence Intervention   Status: Patient Agreed - Adherence/Education

## 2024-10-15 NOTE — Clinical Note
Dr. Laura, I did end up increasing his jardiance to 25 mg today, though please let me know if you are opposed to this or if you had a different plan in mind. I do want to watch closely for dizziness, but he said that his dizziness he thinks is related to his anxiety. I told him to talk more with you about his anxiety, he may be a good candidate for ssri - was even in the ED last month for chest pain.   Sounds like has had a little trouble with remembering all medications so I am having him switch to just twice daily administration to see if this helps.   I want to talk with him about maybe starting a CGM at next visit if A1c does not improve. Or could consider referral to diabetic education. But for now he will focus on diet and exercise and med adherence.   Thanks Jose

## 2024-10-15 NOTE — PATIENT INSTRUCTIONS
"Recommendations from today's MTM visit:                                                    MTM (medication therapy management) is a service provided by a clinical pharmacist designed to help you get the most of out of your medicines.   Today we reviewed what your medicines are for, how to know if they are working, that your medicines are safe and how to make your medicine regimen as easy as possible.      Increase dose: Jardiance 25 mg daily  Focus on diet and exercise to improve blood sugar at this time  Change medication timing to help with adherence  Patient to take Jardiance in the morning   Patient to take lisinopril and atorvastatin with evening dose of metformin    Follow-up: Return in about 3 months (around 1/23/2025) for With PharmD.    It was great speaking with you today.  I value your experience and would be very thankful for your time in providing feedback in our clinic survey. In the next few days, you may receive an email or text message from [employer] with a link to a survey related to your  clinical pharmacist.\"     To schedule another MTM appointment, please call the clinic directly or you may call the MTM scheduling line at [phone].    My Clinical Pharmacist's contact information:                                                      Please feel free to contact me with any questions or concerns you have.      Flora Waller, PharmD, [facility] Institute[facility]  Medication Therapy Management Pharmacist    "

## 2024-12-17 DIAGNOSIS — E11.9 TYPE 2 DIABETES MELLITUS WITHOUT COMPLICATION, WITHOUT LONG-TERM CURRENT USE OF INSULIN (H): Primary | ICD-10-CM

## 2024-12-19 PROBLEM — Z90.49 STATUS POST CHOLECYSTECTOMY: Status: ACTIVE | Noted: 2024-12-19

## 2024-12-23 ENCOUNTER — PATIENT OUTREACH (OUTPATIENT)
Dept: CARE COORDINATION | Facility: CLINIC | Age: 34
End: 2024-12-23
Payer: COMMERCIAL

## 2024-12-23 NOTE — PROGRESS NOTES
Clinic Care Coordination Contact  Community Health Worker Initial Outreach    CHW Initial Information Gathering:  Referral Source: PCP  Preferred Hospital: [facility]  [phone]  Preferred Urgent Care: [facility], [phone]  Current living arrangement:: I live in a private home with family  Type of residence:: Apartment  Community Resources: None  Supplies Currently Used at Home: Diabetic Supplies  Equipment Currently Used at Home: none  Informal Support system:: Family  No PCP office visit in Past Year: No  Transportation means:: Family, Friend, Accessible car  CHW Additional Questions  If ED/Hospital discharge, follow-up appointment scheduled as recommended?: N/A  Medication changes made following ED/Hospital discharge?: N/A  MyChart active?: No  Patient agreeable to assistance with activating MyChart?: No    Patient accepts CC: No, patient does not need CCC services and no coordination need identify. Patient will be sent Care Coordination introduction letter for future reference.

## 2025-01-23 ENCOUNTER — TELEPHONE (OUTPATIENT)
Dept: PHARMACY | Facility: CLINIC | Age: 35
End: 2025-01-23
Payer: COMMERCIAL

## 2025-01-23 NOTE — TELEPHONE ENCOUNTER
PharmD Outbound Call:     Reason for call: MTM appointment today - had too much work, couldn't attend today  Agreed to reschedule at next available - 3/6       Flora Waller, PharmD, BCACP  Medication Therapy Management Pharmacist

## 2025-02-04 ENCOUNTER — ALLIED HEALTH/NURSE VISIT (OUTPATIENT)
Dept: FAMILY MEDICINE | Facility: CLINIC | Age: 35
End: 2025-02-04
Payer: COMMERCIAL

## 2025-02-04 VITALS — TEMPERATURE: 97.9 F

## 2025-02-04 DIAGNOSIS — Z23 ENCOUNTER FOR IMMUNIZATION: Primary | ICD-10-CM

## 2025-02-04 DIAGNOSIS — Z23 NEED FOR VACCINATION: Primary | ICD-10-CM

## 2025-02-04 PROCEDURE — 90714 TD VACC NO PRESV 7 YRS+ IM: CPT

## 2025-02-04 PROCEDURE — 99207 PR NO CHARGE NURSE ONLY: CPT

## 2025-02-04 PROCEDURE — 90471 IMMUNIZATION ADMIN: CPT

## 2025-02-04 NOTE — PROGRESS NOTES
Prior to immunization administration, verified patients identity using patient s name and date of birth. Please see Immunization Activity for additional information.     Is the patient's temperature normal (100.5 or less)? Yes     Patient MEETS CRITERIA. PROCEED with vaccine administration.      Patient instructed to remain in clinic for 15 minutes afterwards, and to report any adverse reactions.      Link to Ancillary Visit Immunization Standing Orders SmartSet     Screening performed by Akiko Bates MA on 2/4/2025 at 2:57 PM.

## 2025-02-04 NOTE — LETTER
February 4, 2025      Justo Jackson  [address]   [place] [address]        To Whom It May Concern:    Justo Jackson was seen in our clinic. He may return to work without restrictions.      Sincerely,      BEKAH Rojas MA/ALEX    Electronically signed

## 2025-03-06 ENCOUNTER — OFFICE VISIT (OUTPATIENT)
Dept: PHARMACY | Facility: CLINIC | Age: 35
End: 2025-03-06
Payer: COMMERCIAL

## 2025-03-06 VITALS
WEIGHT: 208.5 LBS | HEART RATE: 98 BPM | SYSTOLIC BLOOD PRESSURE: 120 MMHG | DIASTOLIC BLOOD PRESSURE: 86 MMHG | BODY MASS INDEX: 34.17 KG/M2 | OXYGEN SATURATION: 98 %

## 2025-03-06 DIAGNOSIS — E11.9 TYPE 2 DIABETES MELLITUS WITHOUT COMPLICATION, WITHOUT LONG-TERM CURRENT USE OF INSULIN (H): Primary | ICD-10-CM

## 2025-03-06 DIAGNOSIS — I15.9 SECONDARY HYPERTENSION: ICD-10-CM

## 2025-03-06 DIAGNOSIS — F51.01 PRIMARY INSOMNIA: ICD-10-CM

## 2025-03-06 DIAGNOSIS — E78.2 MIXED HYPERLIPIDEMIA: ICD-10-CM

## 2025-03-06 RX ORDER — KETOROLAC TROMETHAMINE 30 MG/ML
1 INJECTION, SOLUTION INTRAMUSCULAR; INTRAVENOUS ONCE
Qty: 1 EACH | Refills: 0 | Status: SHIPPED | OUTPATIENT
Start: 2025-03-06 | End: 2025-03-06

## 2025-03-06 RX ORDER — HYDROCHLOROTHIAZIDE 12.5 MG/1
CAPSULE ORAL
Qty: 6 EACH | Refills: 3 | Status: SHIPPED | OUTPATIENT
Start: 2025-03-06

## 2025-03-06 NOTE — PATIENT INSTRUCTIONS
"Recommendations from today's MTM visit:                                                    MTM (medication therapy management) is a service provided by a clinical pharmacist designed to help you get the most of out of your medicines.       and resume Jardiance 10 mg daily   and start using Gayle 3 + sensors and reader to check your blood sugar   Bring to next visit for education on use   over the counter Melatonin 3 mg and take at bedtime as needed    Follow-up: Return in about 5 days (around 3/11/2025) for With PharmD.    It was great speaking with you today.  I value your experience and would be very thankful for your time in providing feedback in our clinic survey. In the next few days, you may receive an email or text message from "[employer] with a link to a survey related to your  clinical pharmacist.\"     To schedule another MTM appointment, please call the clinic directly or you may call the MTM scheduling line at [phone].    My Clinical Pharmacist's contact information:                                                      Please feel free to contact me with any questions or concerns you have.      Flora Waller, PharmD, BCACP  Medication Therapy Management Pharmacist    "

## 2025-03-06 NOTE — PROGRESS NOTES
Medication Therapy Management (MTM) Encounter    ASSESSMENT:                            Medication Adherence/Access: No issues identified.    1. Type 2 diabetes mellitus without complication, without long-term current use of insulin (H) (Primary)  A1c improving, not yet at goal <7%.  Patient not currently taking Jardiance due to misinformation, medication education provided to patient today and encouraged patient resume lower dose for now, patient agreeable.  Patient would benefit from switching traditional meter to continuous glucose monitor, prescription sent and patient will bring back next week for education on use.    2. Secondary hypertension  BP not meeting goal <130/80 mmHg in clinic today, though previously has been at goal.  Could consider dose increase of lisinopril at next visit if needed.  Patient continues to express concern for symptoms of anxiety, encouraged patient to review with PCP at next visit.    3. Mixed hyperlipidemia  Appropriately taking moderate intensity statin.  LDL has not been checked since starting therapy, recommend rechecking at next PCP visit.    4. Primary insomnia  Encouraged patient to  melatonin over-the-counter and take as needed for insomnia.    PLAN:                             and resume Jardiance 10 mg daily   and start using Gayle 3 + sensors and reader to check your blood sugar   Bring to next visit for education on use   over the counter Melatonin 3 mg and take at bedtime as needed    To be addressed at a future visit:   A1c & fasting lipid at PCP on 3/25  Increase lisinopril?  Anxiety to be discussed with PCP    Follow-up: Return in about 5 days (around 3/11/2025) for With PharmD.    SUBJECTIVE/OBJECTIVE:                          Justo Jackson is a 34 year old male seen for a follow-up visit.  (ID# [id number]) was used during today's visit.       Reason for visit: MTM follow up - initial for 2025.    Allergies/ADRs: Reviewed in  chart  Past Medical History: Reviewed in chart  Tobacco: He reports that he quit smoking about 9 years ago. His smoking use included cigarettes. He started smoking about 5 years ago. He has a 2.6 pack-year smoking history. He has never been exposed to tobacco smoke. He has never used smokeless tobacco.  Alcohol: none    Medication Adherence/Access: Self management. Patient takes medications directly from bottles.  Patient takes medications 2 time(s) per day.   Per patient, misses medication 2 time(s) per week. Admits that he will forget the AM dose if rushing out the house, and then he will skip the dose.     Diabetes   Metformin  mg - 2 tablets twice daily   Jardiance - reports NOT taking anymore, last time he took this was the 25 mg dose, back in November. He was worried that this was damaging his kidneys.   Patient is not experiencing medication side effects. Still does have bouts of diarrhea but associates that with certain foods.   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 Reports no changes to diet or appetite. Patient not interested in meeting with diabetic educator at this time.      Blood sugar monitoring: Traditional meter once weekly; Ranges: (patient reported, does not bring meter today)   Post-Prandial- 198  He is interested in trying CGM at this time.     Eye exam is up to date  Foot exam is up to date    Hypertension   Lisinopril 5 mg daily PM  Patient reports the following medication side effects: dizziness sometimes, still associating this with anxiety attacks. Feels anxious with chest pain at times, average a couple times a month   Patient self monitors blood pressure.  Home BP monitoring 130/80-90; 90 HR .       Hyperlipidemia   Atorvastatin 10 mg daily PM  Patient reports no significant myalgias or other side effects.  The ASCVD Risk score (Hancock DK, et al., 2019) failed to calculate for the following reasons:    The 2019 ASCVD risk score is only  valid for ages 40 to 79     Insomnia   Melatonin 3 mg at bedtime - states that he never received this when he checked with the pharmacy  Patient reports he still is having some trouble with sleep, but better than before. He still wants to try the melatonin.      Nose plug  Flonase - no longer using, not necessary anymore     Today's Vitals: /86   Pulse 98   Wt 208 lb 8 oz (94.6 kg)   SpO2 98%   BMI 34.17 kg/m    ----------------      I spent 60 minutes with this patient today. All changes were made via collaborative practice agreement with Graciela Ovalle MD.     A summary of these recommendations was given to the patient.    Flroa Waller, PharmD, [facility]  Medication Therapy Management Pharmacist     Medication Therapy Recommendations  Diabetes mellitus, type 2 (H)   1 Current Medication: empagliflozin (JARDIANCE) 10 MG TABS tablet   Current Medication Sig: Take 1 tablet (10 mg) by mouth daily.   Rationale: Does not understand instructions - Adherence - Adherence   Recommendation: Provide Education   Status: Patient Agreed - Adherence/Education   Identified Date: 3/6/2025 Completed Date: 3/6/2025         Primary insomnia   1 Current Medication: melatonin 3 MG tablet   Current Medication Sig: Take 1 tablet (3 mg) by mouth nightly as needed for sleep.   Rationale: Medication product not available - Adherence - Adherence   Recommendation: Provide Education   Status: Resolved Med Access Issue   Identified Date: 3/6/2025 Completed Date: 3/6/2025

## 2025-03-06 NOTE — Clinical Note
Dr. Laura, 1. Patient reports that he has not been taking the Jardiance since November because he was concerned that it would cause damage to his kidneys.  I provided education and reassured him that this is renal protective today, he was agreeable to restart the 10 mg dose.   2. I got him set up with jonathan CGM today. 3.  His diastolic blood pressure was high today, could consider increasing the lisinopril at next visit if needed 4.  Patient continues to express concerns of anxiety symptoms to me, states that his last ED visit in January was anxiety related.  I recommended that he discuss his concerns with you and that you may offer him therapy or a medication to help with his anxiety.  Please let me know if you have any questions Jose

## 2025-03-11 ENCOUNTER — TELEPHONE (OUTPATIENT)
Dept: PHARMACY | Facility: CLINIC | Age: 35
End: 2025-03-11
Payer: COMMERCIAL

## 2025-03-11 NOTE — TELEPHONE ENCOUNTER
9 am ID# [id number] - called and left VM about rescheduling visit, due to schedule change    11 am ID# [id number] - pt had to block work today for this appointment, prefers to try placing sensor independently. Patient will call the clinic to reschedule MTM visit for education if needed. Patient has not yet picked up sensors, but thinks the pharmacy has it now.     F/up MTM as scheduled

## 2025-03-13 ENCOUNTER — TELEPHONE (OUTPATIENT)
Dept: FAMILY MEDICINE | Facility: CLINIC | Age: 35
End: 2025-03-13
Payer: COMMERCIAL

## 2025-03-13 NOTE — TELEPHONE ENCOUNTER
----- Message from Flora Waller sent at 3/13/2025  7:29 AM CDT -----  Staff, can someone reach out and please let patient know to bring all new blood sugar monitor supplies to next visit with PCP on 3/25?    Thanks!  Jose  ----- Message -----  From: Graciela Ovalle MD  Sent: 3/12/2025   4:06 PM CDT  To: Flora Waller, PharmD    Yes! Thank you! Can you or nurses let him know to bring it in case he has questions?  ----- Message -----  From: Flora Waller, PharmD  Sent: 3/12/2025   1:35 PM CDT  To: Graciela Ovalle MD    Random message for you. I wanted him to start on Gayle for blood sugar monitoring. Unfortunately my schedule changed last minute and wasn't able to see him Tuesday this week to show him how to use the new meter.  He sees you 3/25 - if he brings it with that day and needs help with education can you tell me and I will fit him in that day?    Jose

## 2025-03-19 ENCOUNTER — APPOINTMENT (OUTPATIENT)
Dept: INTERPRETER SERVICES | Facility: CLINIC | Age: 35
End: 2025-03-19
Payer: COMMERCIAL

## 2025-03-19 NOTE — TELEPHONE ENCOUNTER
called using language line to let pt know to bring all new blood sugar monitor supplies to next visit with PCP on 03/25.Pt is aware of the appt and will bring all the supplies.

## 2025-03-24 DIAGNOSIS — E11.9 TYPE 2 DIABETES MELLITUS WITHOUT COMPLICATION, WITHOUT LONG-TERM CURRENT USE OF INSULIN (H): Primary | ICD-10-CM

## 2025-03-24 DIAGNOSIS — E78.5 HYPERLIPIDEMIA LDL GOAL <70: ICD-10-CM

## 2025-03-25 ENCOUNTER — OFFICE VISIT (OUTPATIENT)
Dept: FAMILY MEDICINE | Facility: CLINIC | Age: 35
End: 2025-03-25
Payer: COMMERCIAL

## 2025-03-25 ENCOUNTER — OFFICE VISIT (OUTPATIENT)
Dept: PHARMACY | Facility: CLINIC | Age: 35
End: 2025-03-25
Payer: COMMERCIAL

## 2025-03-25 VITALS
WEIGHT: 198 LBS | RESPIRATION RATE: 12 BRPM | BODY MASS INDEX: 31.82 KG/M2 | DIASTOLIC BLOOD PRESSURE: 87 MMHG | HEIGHT: 66 IN | OXYGEN SATURATION: 98 % | SYSTOLIC BLOOD PRESSURE: 133 MMHG | TEMPERATURE: 98.1 F | HEART RATE: 84 BPM

## 2025-03-25 DIAGNOSIS — F41.1 GENERALIZED ANXIETY DISORDER WITH PANIC ATTACKS: ICD-10-CM

## 2025-03-25 DIAGNOSIS — E11.9 TYPE 2 DIABETES MELLITUS WITHOUT COMPLICATION, WITHOUT LONG-TERM CURRENT USE OF INSULIN (H): ICD-10-CM

## 2025-03-25 DIAGNOSIS — Z00.00 ROUTINE GENERAL MEDICAL EXAMINATION AT A HEALTH CARE FACILITY: Primary | ICD-10-CM

## 2025-03-25 DIAGNOSIS — F41.0 GENERALIZED ANXIETY DISORDER WITH PANIC ATTACKS: ICD-10-CM

## 2025-03-25 DIAGNOSIS — E11.9 TYPE 2 DIABETES MELLITUS WITHOUT COMPLICATION, WITHOUT LONG-TERM CURRENT USE OF INSULIN (H): Primary | ICD-10-CM

## 2025-03-25 DIAGNOSIS — A63.0 GENITAL WARTS: ICD-10-CM

## 2025-03-25 DIAGNOSIS — I10 ESSENTIAL HYPERTENSION, BENIGN: ICD-10-CM

## 2025-03-25 DIAGNOSIS — E78.5 HYPERLIPIDEMIA LDL GOAL <70: ICD-10-CM

## 2025-03-25 LAB
CHOLEST SERPL-MCNC: 136 MG/DL
EST. AVERAGE GLUCOSE BLD GHB EST-MCNC: 220 MG/DL
FASTING STATUS PATIENT QL REPORTED: YES
HBA1C MFR BLD: 9.3 % (ref 0–5.6)
HDLC SERPL-MCNC: 34 MG/DL
LDLC SERPL CALC-MCNC: 41 MG/DL
NONHDLC SERPL-MCNC: 102 MG/DL
TRIGL SERPL-MCNC: 303 MG/DL

## 2025-03-25 PROCEDURE — 99214 OFFICE O/P EST MOD 30 MIN: CPT | Mod: 25 | Performed by: FAMILY MEDICINE

## 2025-03-25 PROCEDURE — 99606 MTMS BY PHARM EST 15 MIN: CPT | Performed by: PHARMACIST

## 2025-03-25 PROCEDURE — 80061 LIPID PANEL: CPT | Performed by: FAMILY MEDICINE

## 2025-03-25 PROCEDURE — 99395 PREV VISIT EST AGE 18-39: CPT | Performed by: FAMILY MEDICINE

## 2025-03-25 PROCEDURE — 99607 MTMS BY PHARM ADDL 15 MIN: CPT | Performed by: PHARMACIST

## 2025-03-25 PROCEDURE — 36415 COLL VENOUS BLD VENIPUNCTURE: CPT | Performed by: FAMILY MEDICINE

## 2025-03-25 PROCEDURE — 3075F SYST BP GE 130 - 139MM HG: CPT | Performed by: FAMILY MEDICINE

## 2025-03-25 PROCEDURE — G2211 COMPLEX E/M VISIT ADD ON: HCPCS | Performed by: FAMILY MEDICINE

## 2025-03-25 PROCEDURE — 83036 HEMOGLOBIN GLYCOSYLATED A1C: CPT | Performed by: FAMILY MEDICINE

## 2025-03-25 PROCEDURE — 3079F DIAST BP 80-89 MM HG: CPT | Performed by: FAMILY MEDICINE

## 2025-03-25 RX ORDER — LISINOPRIL 10 MG/1
10 TABLET ORAL DAILY
Qty: 30 TABLET | Refills: 11 | Status: SHIPPED | OUTPATIENT
Start: 2025-03-25

## 2025-03-25 SDOH — HEALTH STABILITY: PHYSICAL HEALTH: ON AVERAGE, HOW MANY MINUTES DO YOU ENGAGE IN EXERCISE AT THIS LEVEL?: 30 MIN

## 2025-03-25 SDOH — HEALTH STABILITY: PHYSICAL HEALTH: ON AVERAGE, HOW MANY DAYS PER WEEK DO YOU ENGAGE IN MODERATE TO STRENUOUS EXERCISE (LIKE A BRISK WALK)?: 2 DAYS

## 2025-03-25 ASSESSMENT — SOCIAL DETERMINANTS OF HEALTH (SDOH): HOW OFTEN DO YOU GET TOGETHER WITH FRIENDS OR RELATIVES?: NEVER

## 2025-03-25 NOTE — PATIENT INSTRUCTIONS
"Recommendations from today's MTM visit:                                                    MTM (medication therapy management) is a service provided by a clinical pharmacist designed to help you get the most of out of your medicines.        Start using Ozempic 0.25 mg weekly; education provided today   Continue to use the Gayle 3 to monitor blood sugars and bring meter to next visit as discussed today    Follow-up: No follow-ups on file.    It was great speaking with you today.  I value your experience and would be very thankful for your time in providing feedback in our clinic survey. In the next few days, you may receive an email or text message from [employer] with a link to a survey related to your  clinical pharmacist.\"     To schedule another MTM appointment, please call the clinic directly or you may call the MTM scheduling line at [phone].    My Clinical Pharmacist's contact information:                                                      Please feel free to contact me with any questions or concerns you have.      Flora Waller, PharmD, [facility]  Medication Therapy Management Pharmacist    "

## 2025-03-25 NOTE — PATIENT INSTRUCTIONS
"Patient Education   Consejos de atención preventiva  Se trata de consejos generales que solemos ofrecer para ayudar a las personas a mantenerse saludables. Cain equipo de atención puede darle consejos específicos. Hable con ellos sobre edith propias necesidades de atención preventiva.  Estilo de leda  Ejercítese, nabeel mínimo, 150 minutos a la semana (30 minutos al día, 5 días a la semana).  Realice actividades de fortalecimiento muscular 2 días a la semana, ya que contribuyen al control del peso y a la prevención de enfermedades.  No fume.  Use protector solar para prevenir el cáncer de piel.  Cada 2 a 5 años, realice pruebas de detección de radón en cain hogar. Se trata de un gas incoloro e inodoro que puede dañar los pulmones. Para obtener más información, visite www.health.[facility].mn.us y busque \"Radon in Homes\" (Radón en los hogares).  Mantenga las brandi descargadas y bajo llave en un lugar seguro, nabeel fish caja josiah o fish cámara blindada, o use un candado para brandi y esconda las llaves. Guarde siempre las balas por separado. Para obtener más información, visite [place] y busque \"Safe Gun Storage\" (Almacenamiento seguro rj).  Alimentación  Consuma 5 o más porciones de frutas y verduras al día.  Pruebe el pan de julita, el arroz integral y la pasta integral (en lugar de pan maria, arroz maria y pasta).  Consuma suficiente calcio y vitamina D. Revise la etiqueta de los alimentos y procure alcanzar el 100 % de la ingesta diaria recomendada (IDR).  Exámenes periódicos  Hágase un examen dental y fish limpieza cada 6 meses.  Visite a cain equipo de atención médica todos los años para hablar sobre lo siguiente:  Todo cambio en cain fabiola.  Todo medicamento que cain equipo de atención le haya recetado.  Atención preventiva, planificación familiar y formas de prevenir enfermedades crónicas.  Inyecciones (vacunas)   Vacunas contra el virus del papiloma humano (VPH) (hasta los 26 años), si nunca se las wu colocado.  Vacunas " contra la hepatitis B (hasta los 59 años), si nunca se las wu colocado.  Vacuna contra la COVID-19: vacúnese cuando corresponda.  Vacuna contra la gripe: vacúnese contra la gripe todos los años.  Vacuna contra el tétanos: vacúnese contra el tétanos cada 10 años.  Vacunas contra el neumococo, la hepatitis A y el virus sincicial respiratorio (VSR): pregunte a cain equipo de atención si las necesita en función de cain riesgo.  Vacuna contra el herpes zóster (para personas de 50 años o más).  Pruebas médicas generales  Examen de detección de diabetes:  A partir de los 35 años, hágase exámenes de detección de diabetes, nabeel mínimo, cada 3 años.  Si tiene menos de 35 años, pregunte a cain equipo de atención si debe hacerlo.  Prueba de colesterol: a los 39 años, comience a hacerse fish prueba de colesterol cada 5 años o con mayor frecuencia si se lo aconsejan.  Exploración de densidad ósea (DEXA): a los 50 años, pregunte a cain equipo de atención si debe hacerse esta exploración para detectar osteoporosis (fragilidad en los huesos).  Hepatitis C: hágase la prueba, nabeel mínimo, fish vez en la leda.  Examen de detección de aneurismas aórticos abdominales: hable con cain médico sobre la posibilidad de hacerse celi examen de detección si cumple alguna de las siguientes condiciones:  fumó alguna vez;  y  es hombre según cain sexo biológico;  y  tiene entre 65 y 75 años.  Infecciones de transmisión sexual (ITS)  Antes de los 24 años, pregunte a cain equipo de atención si debe hacerse exámenes de detección de ITS.  Después de los 24 años, hágase exámenes de detección de ITS si está en riesgo. Está en riesgo de contraer alguna ITS (incluido el virus de la inmunodeficiencia adquirida [VIH]) en los siguientes casos:  Tiene relaciones sexuales con más de fish persona.  No usa preservativos siempre que tiene relaciones sexuales.  A usted o a cain caro le diagnosticaron fish infección de transmisión sexual.  Si está en riesgo de contraer el VIH,  consulte sobre los medicamentos de la profilaxis de preexposición (Pre-Exposure Prophylaxis, PrEP) para prevenirlo.  Hágase la prueba del VIH, nabeel mínimo, fish vez en la leda, tanto si está en riesgo de contraer el virus nabeel si no.  Pruebas de detección de cáncer  Examen de detección de cáncer de leonard uterino: si tiene leonard uterino, comience a hacerse pruebas periódicas de detección de cáncer de leonard uterino a los 21 años. La mayoría de las personas que se hacen exámenes periódicos de detección y obtienen resultados normales pueden dejar de hacerlo a partir de los 65 años. Hable sobre esto con cain proveedor.  Exploración de detección de cáncer de mama (mamografía): si alguna vez ha tenido mamas, comience a hacerse mamografías periódicas a partir de los 40 años. Se trata de fish exploración para detectar el cáncer de mama.  Examen de detección de cáncer de colon: es importante comenzar a hacerse los exámenes de detección de cáncer de colon a los 45 años.  Hágase fish colonoscopía cada 10 años (o con más frecuencia si está en riesgo) O pregunte a cain proveedor sobre pruebas de heces, nabeel la prueba inmunoquímica fecal (Fecal Immunochemical Test, FIT) cada año o la prueba Cologuard cada 3 años.  Para obtener más información sobre las opciones de las pruebas que tiene a disposición, visite: www.fvfiles.com/307605rr.  Si necesita ayuda para tennille fish decisión, visite: [email or web address].  Prueba de detección de cáncer de próstata: si tiene próstata y está entre los 55 y 69 años, pregunte a cain proveedor si le convendría hacerse fish prueba anual de detección de cáncer de próstata.  Examen de detección de cáncer de pulmón: si fuma o fumó y tiene entre 50 y 80 años, pregunte a cain equipo de atención si los exámenes continuos de detección de cáncer de pulmón son adecuados para usted.    Solo para uso con fines informativos. Gay documento no pretende sustituir ninguna recomendación médica. Derechos de autor   2023 [facility].   Iza bartlett. Revisión clínica a [facility] Pep Transitions Program. SMARTworks [id number] - REV 04/24.  Relationships for Good Health  Relationships are important for our health and happiness. Social isolation, loneliness and lack of support are bad for your health. Studies show that loneliness can harm health and limit your life span as much as high blood pressure and smoking.   Take some time to reflect on your relationships. Then answer these questions:  Are there people in your life that cause you stress or drain your energy? What can you do to set limits?  ________________________________________________________________________________________________________________________________________________________________________________________________________________________________________________________________________________________________________________________________________________  Who do you enjoy spending time with? Who can you go to for support?  ________________________________________________________________________________________________________________________________________________________________________________________________________________________________________________________________________________________________________________________________________________  What can you do to improve your relationships with others?  __________________________________________________________________________________________________________________________________________________________________________________________________________________  ______________________________________________________________________________________________________________________________  What do you like most about your relationships with  others?  ________________________________________________________________________________________________________________________________________________________________________________________________________________________________________________________________________________________________________________________________________________  My goal: ______________________________________________________________________  I will: ______________________________________________________________________________________________________________________________________________________________________________________________    For informational purposes only. Not to replace the advice of your health care provider. Copyright   2018 [facility]. All rights reserved. Clinically reviewed by [facility]  Team. SMARTworks [id number] - Rev 06/24.

## 2025-03-25 NOTE — PROGRESS NOTES
Medication Therapy Management (MTM) Encounter    ASSESSMENT:                            Medication Adherence/Access: No issues identified.    1. Type 2 diabetes mellitus without complication, without long-term current use of insulin (H) (Primary)  A1c not at goal <7%. PCP increased Jardiance dose today and started Glp-1 agonist. MTM provided Ozempic education with demonstration pen today. Also reviewed with patient today appropriate process for placing Gayle 3 sensor and answered all of patients questions. Could consider dose increase of Ozempic at next visit.       PLAN:                            Start using Ozempic 0.25 mg weekly; education provided today   Continue to use the Gayle 3 to monitor blood sugars and bring meter to next visit as discussed today    Follow-up: Return in about 5 weeks (around 5/1/2025) for With PharmD.    SUBJECTIVE/OBJECTIVE:                          Justo Jackson is a 34 year old male seen for a follow-up visit. Patient seen with [facility] .      Reason for visit: MTM Follow up.    Allergies/ADRs: Reviewed in chart  Past Medical History: Reviewed in chart  Tobacco: He reports that he quit smoking about 9 years ago. His smoking use included cigarettes. He started smoking about 5 years ago. He has a 2.6 pack-year smoking history. He has never been exposed to tobacco smoke. He has never used smokeless tobacco.  Alcohol: unable to address    Medication Adherence/Access: no issues reported.    Diabetes   Metformin  mg - 2 tablets twice daily   Jardiance 10 mg daily - dose increased per PCP today  Ozempic - prescribed today per PCP, pt has never used injection before  Patient is not experiencing medication side effects.   Current diabetes symptoms: none  Diet/Exercise: Manual labor. Reports that when his blood sugar has been high he has been trying to alter his diet to improve blood sugar.      Blood sugar monitoring: Continuous Glucose Monitor Gayle 3.   Patient had a sensor  fall off recently. Brings in his next sensor, has not yet placed it. Places sensor on right arm during visit. Recommended placing on the back of his arm and to place right away after opening.     Eye exam is up to date  Foot exam is up to date    Today's Vitals: There were no vitals taken for this visit. - seen by pcp today as well  ----------------      I spent 30 minutes with this patient today. All changes were made via collaborative practice agreement with Graciela Ovalle MD.     A summary of these recommendations was given to the patient (see AVS from today's appointment with Graciela Ovalle).    Flora Waller, PharmD, BCACP  Medication Therapy Management Pharmacist     Medication Therapy Recommendations  Diabetes mellitus, type 2 (H)   1 Current Medication: semaglutide (OZEMPIC) 2 MG/3ML pen   Current Medication Sig: Inject 0.25 mg subcutaneously every 7 days.   Rationale: Does not understand instructions - Adherence - Adherence   Recommendation: Provide Education   Status: Patient Agreed - Adherence/Education   Identified Date: 3/25/2025 Completed Date: 3/25/2025

## 2025-03-25 NOTE — PROGRESS NOTES
Preventive Care Visit  [facility] HANGKYLE Ovalle MD, Family Medicine  Mar 25, 2025      Assessment & Plan     Routine general medical examination at a health care facility  Labs, screenings, and vaccines as ordered and counseling as detailed below.    Type 2 diabetes mellitus without complication, without long-term current use of insulin (H)  A1c not at goal < 7% and has  increased. Has CGM now. Data reviewed.  Will increase Jardiance from 10 mg to 25 mg with next refill and start Ozempic 0.25 mg weekly.  He has follow-up with MTM and then will see me in 3 months.  - Hemoglobin A1c  - empagliflozin (JARDIANCE) 25 MG TABS tablet; Take 1 tablet (25 mg) by mouth daily.  - semaglutide (OZEMPIC) 2 MG/3ML pen; Inject 0.25 mg subcutaneously every 7 days.    Hyperlipidemia LDL goal <70  He is fasting today.  Check lipids.  He is on atorvastatin 10 mg.  Will determine if we need to intensify based on labs today.  - Lipid panel reflex to direct LDL Fasting    Essential hypertension, benign  Lisinopril was started for renal protective effects.  His blood pressure has on occasion been greater than 130/80.  Blood pressure goal is less than 130/80.  Will uptitrate lisinopril from 5 mg to 10 mg  - lisinopril (ZESTRIL) 10 MG tablet; Take 1 tablet (10 mg) by mouth daily.    Genital warts  He declines  exam today.  He tried Aldara and it was not helpful.  Refer to dermatology  - Adult Dermatology  Referral; Future    Generalized anxiety disorder with panic attacks  He feels his anxiety is significantly improved over the past several months.  His last panic attack was in January.  He is sleeping better.  Does not wish to start medication or therapy for anxiety at this time.  Continue to monitor    Patient has been advised of split billing requirements and indicates understanding: Yes        BMI  Estimated body mass index is 31.71 kg/m  as calculated from the following:    Height as of  "this encounter: 1.683 m (5' 6.26\").    Weight as of this encounter: 89.8 kg (198 lb).   Weight management plan: Discussed healthy diet and exercise guidelines    Counseling  Appropriate preventive services were addressed with this patient via screening, questionnaire, or discussion as appropriate for fall prevention, nutrition, physical activity, Tobacco-use cessation, social engagement, weight loss and cognition.  Checklist reviewing preventive services available has been given to the patient.  Reviewed patient's diet, addressing concerns and/or questions.   He is at risk for lack of exercise and has been provided with information to increase physical activity for the benefit of his well-being.   Patient is at risk for social isolation and has been provided with information about the benefit of social connection.   The patient was instructed to see the dentist every 6 months.       Follow-up in 3 months for diabetes  The longitudinal plan of care for the diagnosis(es)/condition(s) as documented were addressed during this visit. Due to the added complexity in care, I will continue to support Justo in the subsequent management and with ongoing continuity of care.    Moriah Kemp is a 34 year old, presenting for the following:  Physical        3/25/2025     8:16 AM   Additional Questions   Roomed by Akiko Bates   Accompanied by Self          HPI  Diabetes - last 7 days  68% above range  32% within range  No lows  Trends - high from 6am-6pm, and again closer to midnight    Last 30 days: 84% above, 16% within, no lows    Is fasting today    Anxiety - feels better  Sleeping better    Wants to move - does not feel safe because of neighbors    Warts did not really get better with aldara- his wife does not have them anymore           Advance Care Planning  Patient does not have a Health Care Directive: not discussed today      3/25/2025   General Health   How would you rate your overall physical health? Good   Feel " stress (tense, anxious, or unable to sleep) Only a little   (!) STRESS CONCERN      3/25/2025   Nutrition   Three or more servings of calcium each day? Yes   Diet: Regular (no restrictions)   How many servings of fruit and vegetables per day? (!) 0-1   How many sweetened beverages each day? 0-1         3/25/2025   Exercise   Days per week of moderate/strenous exercise 2 days   Average minutes spent exercising at this level 30 min   (!) EXERCISE CONCERN      3/25/2025   Social Factors   Frequency of gathering with friends or relatives Never   Worry food won't last until get money to buy more No   Food not last or not have enough money for food? No   Do you have housing? (Housing is defined as stable permanent housing and does not include staying ouside in a car, in a tent, in an abandoned building, in an overnight shelter, or couch-surfing.) Yes   Are you worried about losing your housing? No   Lack of transportation? No   Unable to get utilities (heat,electricity)? Yes   Want help with housing or utility concern? No   (!) FINANCIAL RESOURCE STRAIN CONCERN(!) SOCIAL CONNECTIONS CONCERN      3/25/2025   Dental   Dentist two times every year? (!) NO           Today's PHQ-2 Score:       3/25/2025     8:15 AM   PHQ-2 ( 1999 [employer])   Q1: Little interest or pleasure in doing things 0    Q2: Feeling down, depressed or hopeless 0    PHQ-2 Score 0    Q1: Little interest or pleasure in doing things Not at all   Q2: Feeling down, depressed or hopeless Not at all   PHQ-2 Score 0       Proxy-reported           3/25/2025   Substance Use   Alcohol more than 3/day or more than 7/wk No   Do you use any other substances recreationally? No     Social History     Tobacco Use    Smoking status: Former     Current packs/day: 0.50     Average packs/day: 0.5 packs/day for 5.2 years (2.6 ttl pk-yrs)     Types: Cigarettes     Start date: 2020     Quit date: 2016     Passive exposure: Never    Smokeless tobacco: Never    Tobacco comments:      "Passive smoker - through work   Vaping Use    Vaping status: Never Used   Substance Use Topics    Alcohol use: Not Currently    Drug use: Never           3/25/2025   STI Screening   New sexual partner(s) since last STI/HIV test? No         3/25/2025   Contraception/Family Planning   Questions about contraception or family planning No        Reviewed and updated as needed this visit by Provider   Tobacco  Allergies  Meds  Problems  Med Hx  Surg Hx  Fam Hx                  Review of Systems  Constitutional, HEENT, cardiovascular, pulmonary, gi and gu systems are negative, except as otherwise noted.     Objective    Exam  /87 (BP Location: Right arm, Patient Position: Sitting, Cuff Size: Adult Regular)   Pulse 84   Temp 98.1  F (36.7  C) (Oral)   Resp 12   Ht 1.683 m (5' 6.26\")   Wt 89.8 kg (198 lb)   SpO2 98%   BMI 31.71 kg/m     Estimated body mass index is 31.71 kg/m  as calculated from the following:    Height as of this encounter: 1.683 m (5' 6.26\").    Weight as of this encounter: 89.8 kg (198 lb).  BP Readings from Last 3 Encounters:   03/25/25 133/87   03/06/25 120/86   12/19/24 124/80       Physical Exam  GENERAL: alert and no distress  EYES: Eyes grossly normal to inspection, PERRL and conjunctivae and sclerae normal  HENT: ear canals and TM's normal, nose and mouth without ulcers or lesions  NECK: no adenopathy, no asymmetry, masses, or scars  RESP: lungs clear to auscultation - no rales, rhonchi or wheezes  CV: regular rate and rhythm, normal S1 S2, no S3 or S4, no murmur, click or rub, no peripheral edema  ABDOMEN: soft, nontender, no hepatosplenomegaly, no masses and bowel sounds normal  MS: no gross musculoskeletal defects noted, no edema  SKIN: no suspicious lesions or rashes  NEURO: Normal strength and tone, mentation intact and speech normal  PSYCH: mentation appears normal, affect normal/bright        Signed Electronically by: Graciela Ovalle MD    "

## 2025-03-27 NOTE — RESULT ENCOUNTER NOTE
RN team - please call patient with results. Cholesterol levels are much better while taking the atorvastatin. He should keep taking this and work on blood sugar control as we are doing.

## 2025-03-28 ENCOUNTER — APPOINTMENT (OUTPATIENT)
Dept: INTERPRETER SERVICES | Facility: CLINIC | Age: 35
End: 2025-03-28
Payer: COMMERCIAL

## 2025-04-08 ENCOUNTER — TRANSFERRED RECORDS (OUTPATIENT)
Dept: HEALTH INFORMATION MANAGEMENT | Facility: CLINIC | Age: 35
End: 2025-04-08
Payer: COMMERCIAL

## 2025-05-01 ENCOUNTER — APPOINTMENT (OUTPATIENT)
Dept: INTERPRETER SERVICES | Facility: CLINIC | Age: 35
End: 2025-05-01
Payer: COMMERCIAL

## 2025-07-21 ENCOUNTER — OFFICE VISIT (OUTPATIENT)
Dept: FAMILY MEDICINE | Facility: CLINIC | Age: 35
End: 2025-07-21
Payer: COMMERCIAL

## 2025-07-21 VITALS
HEIGHT: 66 IN | OXYGEN SATURATION: 98 % | DIASTOLIC BLOOD PRESSURE: 78 MMHG | WEIGHT: 203 LBS | HEART RATE: 79 BPM | TEMPERATURE: 97.9 F | SYSTOLIC BLOOD PRESSURE: 117 MMHG | RESPIRATION RATE: 16 BRPM | BODY MASS INDEX: 32.62 KG/M2

## 2025-07-21 DIAGNOSIS — F41.0 GENERALIZED ANXIETY DISORDER WITH PANIC ATTACKS: ICD-10-CM

## 2025-07-21 DIAGNOSIS — E11.9 TYPE 2 DIABETES MELLITUS WITHOUT COMPLICATION, WITHOUT LONG-TERM CURRENT USE OF INSULIN (H): Primary | ICD-10-CM

## 2025-07-21 DIAGNOSIS — F41.1 GENERALIZED ANXIETY DISORDER WITH PANIC ATTACKS: ICD-10-CM

## 2025-07-21 DIAGNOSIS — R39.9 URINARY TRACT INFECTION SYMPTOMS: ICD-10-CM

## 2025-07-21 DIAGNOSIS — I10 ESSENTIAL HYPERTENSION, BENIGN: ICD-10-CM

## 2025-07-21 DIAGNOSIS — E11.9 TYPE 2 DIABETES MELLITUS WITHOUT COMPLICATION, WITHOUT LONG-TERM CURRENT USE OF INSULIN (H): ICD-10-CM

## 2025-07-21 DIAGNOSIS — F51.01 PRIMARY INSOMNIA: ICD-10-CM

## 2025-07-21 DIAGNOSIS — A63.0 GENITAL WARTS: ICD-10-CM

## 2025-07-21 DIAGNOSIS — E78.5 HYPERLIPIDEMIA LDL GOAL <100: ICD-10-CM

## 2025-07-21 LAB
ALBUMIN UR-MCNC: NEGATIVE MG/DL
APPEARANCE UR: ABNORMAL
BACTERIA #/AREA URNS HPF: ABNORMAL /HPF
BILIRUB UR QL STRIP: NEGATIVE
COLOR UR AUTO: YELLOW
CREAT UR-MCNC: 200 MG/DL
EST. AVERAGE GLUCOSE BLD GHB EST-MCNC: 209 MG/DL
GLUCOSE UR STRIP-MCNC: 250 MG/DL
HBA1C MFR BLD: 8.9 % (ref 0–5.6)
HGB UR QL STRIP: NEGATIVE
HOLD SPECIMEN: NORMAL
KETONES UR STRIP-MCNC: NEGATIVE MG/DL
LEUKOCYTE ESTERASE UR QL STRIP: NEGATIVE
MICROALBUMIN UR-MCNC: 14.6 MG/L
MICROALBUMIN/CREAT UR: 7.3 MG/G CR (ref 0–17)
NITRATE UR QL: NEGATIVE
PH UR STRIP: 5.5 [PH] (ref 5–7)
RBC #/AREA URNS AUTO: ABNORMAL /HPF
SP GR UR STRIP: >=1.03 (ref 1–1.03)
SQUAMOUS #/AREA URNS AUTO: ABNORMAL /LPF
UROBILINOGEN UR STRIP-ACNC: 0.2 E.U./DL
WBC #/AREA URNS AUTO: ABNORMAL /HPF

## 2025-07-21 PROCEDURE — 83036 HEMOGLOBIN GLYCOSYLATED A1C: CPT | Performed by: FAMILY MEDICINE

## 2025-07-21 PROCEDURE — 36415 COLL VENOUS BLD VENIPUNCTURE: CPT | Performed by: FAMILY MEDICINE

## 2025-07-21 PROCEDURE — 3078F DIAST BP <80 MM HG: CPT | Performed by: FAMILY MEDICINE

## 2025-07-21 PROCEDURE — G2211 COMPLEX E/M VISIT ADD ON: HCPCS | Performed by: FAMILY MEDICINE

## 2025-07-21 PROCEDURE — 82570 ASSAY OF URINE CREATININE: CPT | Performed by: FAMILY MEDICINE

## 2025-07-21 PROCEDURE — 99214 OFFICE O/P EST MOD 30 MIN: CPT | Performed by: FAMILY MEDICINE

## 2025-07-21 PROCEDURE — 3052F HG A1C>EQUAL 8.0%<EQUAL 9.0%: CPT | Performed by: FAMILY MEDICINE

## 2025-07-21 PROCEDURE — 81001 URINALYSIS AUTO W/SCOPE: CPT | Performed by: FAMILY MEDICINE

## 2025-07-21 PROCEDURE — 82043 UR ALBUMIN QUANTITATIVE: CPT | Performed by: FAMILY MEDICINE

## 2025-07-21 PROCEDURE — 3074F SYST BP LT 130 MM HG: CPT | Performed by: FAMILY MEDICINE

## 2025-07-21 RX ORDER — ATORVASTATIN CALCIUM 10 MG/1
10 TABLET, FILM COATED ORAL DAILY
Qty: 30 TABLET | Refills: 11 | Status: SHIPPED | OUTPATIENT
Start: 2025-07-21

## 2025-07-21 RX ORDER — PODOFILOX 5 MG/G
GEL TOPICAL 2 TIMES DAILY
Qty: 3.5 G | Refills: 3 | Status: SHIPPED | OUTPATIENT
Start: 2025-07-21

## 2025-07-21 NOTE — Clinical Note
F2F - can you call patient's wife (number listed in chart) and help to schedule diabetic eye exam? He is often working and unable to answer the phone. Consent to communicate signed today. She is also due for eye exam so if you could schedule her too, that would be great. Thanks!

## 2025-07-21 NOTE — PROGRESS NOTES
Assessment & Plan     Type 2 diabetes mellitus without complication, without long-term current use of insulin (H)  A1c not at goal < 7% but improving. No glucometer to review data today - he has not been able to wear for past month as it kept falling off when it got dirty at job. He plans to cover it with a large bandaid which he will buy at the pharmacy. Increase ozempic to 0.5mg daily. Follow up with MTM in 3mos, then with me. Schedule eye exam - will ask staff to call his wife per his preference (C2C signed today).  - HEMOGLOBIN A1C  - Albumin Random Urine Quantitative with Creat Ratio  - semaglutide (OZEMPIC) 2 MG/3ML pen; Inject 0.5 mg subcutaneously every 7 days.  - Adult Eye  Referral; Future    Urinary tract infection symptoms  Mild - check UA  - UA Macroscopic with reflex to Microscopic and Culture - Lab Collect; Future  - UA Macroscopic with reflex to Microscopic and Culture - Lab Collect  - UA Microscopic with Reflex to Culture    Genital warts  Saw dermatology - had biopsy that confirmed warts. He wishes to try the other topical medicine. Discussed if this isn't effective then will need to return to dermatology for cryosurgery.  - podofilox (CONDYLOX) 0.5 % external gel; Apply topically 2 times daily. Use for 3 days, then stop for 4 days. Repeat up to 4 times.    Essential hypertension, benign  BP at goal <130/80, continue lisinopril.    Hyperlipidemia LDL goal <100  Last LDL was at goal < 100, continue atorva 10mg daily.  - atorvastatin (LIPITOR) 10 MG tablet; Take 1 tablet (10 mg) by mouth daily.      Generalized anxiety disorder with panic attacks  Primary insomnia  With increased physical activity of job, has had decreased anxiety and panic attacks and insomnia. No vero for medication at this time.    Follow up 3mos with MTM and 6mos with me for diabetes  The longitudinal plan of care for the diagnosis(es)/condition(s) as documented were addressed during this visit. Due to the added  complexity in care, I will continue to support Justo in the subsequent management and with ongoing continuity of care.        Moriah Kemp is a 35 year old, presenting for the following health issues:  Diabetes      7/21/2025     8:14 AM   Additional Questions   Roomed by Debora MATA   Accompanied by self     An  was not used for this visit. It was conducted in [place]ic by an approved bilingual provider, #[id number], certified by [employer].           7/21/2025   Forms   Any forms needing to be completed Yes     Via the Health Maintenance questionnaire, the patient has reported the following services have been completed -Eye Exam: shyla 2024-08-18, this information has been sent to the abstraction team.  History of Present Illness       Diabetes:   He presents for follow up of diabetes.  He is checking home blood glucose a few times a month.   He checks blood glucose after meals and at bedtime.  Blood glucose is sometimes over 200 and never under 70.  When his blood glucose is low, the patient is asymptomatic for confusion, blurred vision, lethargy and reports not feeling dizzy, shaky, or weak.  He is concerned about other.    He is not experiencing numbness or burning in feet, excessive thirst, blurry vision, weight changes or redness, sores or blisters on feet. The patient has not had a diabetic eye exam in the last 12 months.          He eats 2-3 servings of fruits and vegetables daily.He consumes 0 sweetened beverage(s) daily.He exercises with enough effort to increase his heart rate 9 or less minutes per day.  He exercises with enough effort to increase his heart rate 3 or less days per week.   He is taking medications regularly.       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      Objective    /78   Pulse 79   Temp 97.9  F (36.6  C) (Oral)   Resp 16   Ht 1.683 m (5' 6.25\")   Wt 92.1 kg (203 lb)   SpO2 98%   BMI 32.52 kg/m    Body mass index is 32.52 kg/m .  Wt Readings from Last 3 Encounters:   07/21/25 92.1 kg (203 lb)   03/25/25 89.8 kg (198 lb)   03/06/25 94.6 kg (208 lb 8 oz)     BP Readings from Last 3 Encounters:   07/21/25 117/78   03/25/25 133/87   03/06/25 120/86           Physical Exam   General: well-appearing, no acute distress  HENT: normocephalic, atraumatic  Eyes: no conjunctival injection, no scleral icterus, EOMI  Neck: normal ROM, supple  Cardiovascular: regular rate  Pulmonary: normal effort  Abdomen: non-distended  Musculoskeletal: grossly normal ROM, no deformity  Extremities: no lower extremity edema  Psych: mood/affect normal  Diabetic foot exam: normal DP and PT pulses, no trophic changes or ulcerative lesions, normal sensory exam, and normal monofilament exam              Signed Electronically by: Graciela Ovalle MD    "

## 2025-07-22 ENCOUNTER — TELEPHONE (OUTPATIENT)
Dept: FAMILY MEDICINE | Facility: CLINIC | Age: 35
End: 2025-07-22
Payer: COMMERCIAL

## 2025-07-22 DIAGNOSIS — E11.22 TYPE 2 DIABETES MELLITUS WITH DIABETIC CHRONIC KIDNEY DISEASE, UNSPECIFIED CKD STAGE, UNSPECIFIED WHETHER LONG TERM INSULIN USE (H): ICD-10-CM

## 2025-07-22 NOTE — TELEPHONE ENCOUNTER
Per appt desk, Eye exam scheduled for:    10/14/2025 1:15 PM (Arrive by 1:00 PM) Efra Lake MD [facility]

## 2025-07-22 NOTE — TELEPHONE ENCOUNTER
Graciela Ovalle MD  P [facility]  F2F - can you call patient's wife (number listed in chart) and help to schedule diabetic eye exam? He is often working and unable to answer the phone. Consent to communicate signed today. She is also due for eye exam so if you could schedule her too, that would be great. Thanks!   done

## 2025-07-23 RX ORDER — METFORMIN HYDROCHLORIDE 500 MG/1
2000 TABLET, EXTENDED RELEASE ORAL
Qty: 120 TABLET | Refills: 2 | Status: SHIPPED | OUTPATIENT
Start: 2025-07-23